# Patient Record
Sex: MALE | Race: WHITE | NOT HISPANIC OR LATINO | Employment: OTHER | URBAN - METROPOLITAN AREA
[De-identification: names, ages, dates, MRNs, and addresses within clinical notes are randomized per-mention and may not be internally consistent; named-entity substitution may affect disease eponyms.]

---

## 2023-05-16 ENCOUNTER — APPOINTMENT (OUTPATIENT)
Dept: CT IMAGING | Facility: CLINIC | Age: 48
End: 2023-05-16
Attending: EMERGENCY MEDICINE

## 2023-05-16 ENCOUNTER — HOSPITAL ENCOUNTER (EMERGENCY)
Facility: CLINIC | Age: 48
Discharge: HOME | End: 2023-05-16
Attending: EMERGENCY MEDICINE | Admitting: EMERGENCY MEDICINE

## 2023-05-16 VITALS
SYSTOLIC BLOOD PRESSURE: 110 MMHG | OXYGEN SATURATION: 99 % | HEART RATE: 74 BPM | DIASTOLIC BLOOD PRESSURE: 74 MMHG | TEMPERATURE: 98.2 F | RESPIRATION RATE: 16 BRPM

## 2023-05-16 DIAGNOSIS — K76.89 LIVER CYST: ICD-10-CM

## 2023-05-16 DIAGNOSIS — N20.0 KIDNEY STONE ON LEFT SIDE: ICD-10-CM

## 2023-05-16 LAB
ALBUMIN UR-MCNC: NEGATIVE MG/DL
ANION GAP SERPL CALCULATED.3IONS-SCNC: 10 MMOL/L (ref 7–15)
APPEARANCE UR: CLEAR
BASOPHILS # BLD AUTO: 0.1 10E3/UL (ref 0–0.2)
BASOPHILS NFR BLD AUTO: 2 %
BILIRUB UR QL STRIP: NEGATIVE
BUN SERPL-MCNC: 9.1 MG/DL (ref 6–20)
CALCIUM SERPL-MCNC: 9.2 MG/DL (ref 8.6–10)
CHLORIDE SERPL-SCNC: 102 MMOL/L (ref 98–107)
COLOR UR AUTO: ABNORMAL
CREAT SERPL-MCNC: 1.02 MG/DL (ref 0.67–1.17)
DEPRECATED HCO3 PLAS-SCNC: 27 MMOL/L (ref 22–29)
EOSINOPHIL # BLD AUTO: 0.4 10E3/UL (ref 0–0.7)
EOSINOPHIL NFR BLD AUTO: 5 %
ERYTHROCYTE [DISTWIDTH] IN BLOOD BY AUTOMATED COUNT: 12.5 % (ref 10–15)
GFR SERPL CREATININE-BSD FRML MDRD: >90 ML/MIN/1.73M2
GLUCOSE SERPL-MCNC: 134 MG/DL (ref 70–99)
GLUCOSE UR STRIP-MCNC: NEGATIVE MG/DL
HCT VFR BLD AUTO: 46.1 % (ref 40–53)
HGB BLD-MCNC: 15.8 G/DL (ref 13.3–17.7)
HGB UR QL STRIP: ABNORMAL
HOLD SPECIMEN: NORMAL
HOLD SPECIMEN: NORMAL
IMM GRANULOCYTES # BLD: 0 10E3/UL
IMM GRANULOCYTES NFR BLD: 0 %
KETONES UR STRIP-MCNC: ABNORMAL MG/DL
LEUKOCYTE ESTERASE UR QL STRIP: NEGATIVE
LYMPHOCYTES # BLD AUTO: 3.5 10E3/UL (ref 0.8–5.3)
LYMPHOCYTES NFR BLD AUTO: 46 %
MCH RBC QN AUTO: 32.1 PG (ref 26.5–33)
MCHC RBC AUTO-ENTMCNC: 34.3 G/DL (ref 31.5–36.5)
MCV RBC AUTO: 94 FL (ref 78–100)
MONOCYTES # BLD AUTO: 0.8 10E3/UL (ref 0–1.3)
MONOCYTES NFR BLD AUTO: 11 %
MUCOUS THREADS #/AREA URNS LPF: PRESENT /LPF
NEUTROPHILS # BLD AUTO: 2.7 10E3/UL (ref 1.6–8.3)
NEUTROPHILS NFR BLD AUTO: 36 %
NITRATE UR QL: NEGATIVE
NRBC # BLD AUTO: 0 10E3/UL
NRBC BLD AUTO-RTO: 0 /100
PH UR STRIP: 6.5 [PH] (ref 5–7)
PLATELET # BLD AUTO: 245 10E3/UL (ref 150–450)
POTASSIUM SERPL-SCNC: 3.8 MMOL/L (ref 3.4–5.3)
RBC # BLD AUTO: 4.92 10E6/UL (ref 4.4–5.9)
RBC URINE: 30 /HPF
SODIUM SERPL-SCNC: 139 MMOL/L (ref 136–145)
SP GR UR STRIP: 1.02 (ref 1–1.03)
UROBILINOGEN UR STRIP-MCNC: NORMAL MG/DL
WBC # BLD AUTO: 7.5 10E3/UL (ref 4–11)
WBC URINE: 2 /HPF

## 2023-05-16 PROCEDURE — 250N000011 HC RX IP 250 OP 636

## 2023-05-16 PROCEDURE — 250N000011 HC RX IP 250 OP 636: Performed by: EMERGENCY MEDICINE

## 2023-05-16 PROCEDURE — 81003 URINALYSIS AUTO W/O SCOPE: CPT | Performed by: EMERGENCY MEDICINE

## 2023-05-16 PROCEDURE — 85025 COMPLETE CBC W/AUTO DIFF WBC: CPT | Performed by: EMERGENCY MEDICINE

## 2023-05-16 PROCEDURE — 99285 EMERGENCY DEPT VISIT HI MDM: CPT | Mod: 25

## 2023-05-16 PROCEDURE — 82310 ASSAY OF CALCIUM: CPT | Performed by: EMERGENCY MEDICINE

## 2023-05-16 PROCEDURE — 96374 THER/PROPH/DIAG INJ IV PUSH: CPT

## 2023-05-16 PROCEDURE — 250N000013 HC RX MED GY IP 250 OP 250 PS 637: Performed by: EMERGENCY MEDICINE

## 2023-05-16 PROCEDURE — 96361 HYDRATE IV INFUSION ADD-ON: CPT

## 2023-05-16 PROCEDURE — 36415 COLL VENOUS BLD VENIPUNCTURE: CPT | Performed by: EMERGENCY MEDICINE

## 2023-05-16 PROCEDURE — 96376 TX/PRO/DX INJ SAME DRUG ADON: CPT

## 2023-05-16 PROCEDURE — 74176 CT ABD & PELVIS W/O CONTRAST: CPT

## 2023-05-16 PROCEDURE — 258N000003 HC RX IP 258 OP 636: Performed by: EMERGENCY MEDICINE

## 2023-05-16 PROCEDURE — 96375 TX/PRO/DX INJ NEW DRUG ADDON: CPT

## 2023-05-16 RX ORDER — TAMSULOSIN HYDROCHLORIDE 0.4 MG/1
0.4 CAPSULE ORAL ONCE
Status: COMPLETED | OUTPATIENT
Start: 2023-05-16 | End: 2023-05-16

## 2023-05-16 RX ORDER — KETOROLAC TROMETHAMINE 15 MG/ML
15 INJECTION, SOLUTION INTRAMUSCULAR; INTRAVENOUS ONCE
Status: COMPLETED | OUTPATIENT
Start: 2023-05-16 | End: 2023-05-16

## 2023-05-16 RX ORDER — ONDANSETRON 2 MG/ML
4 INJECTION INTRAMUSCULAR; INTRAVENOUS ONCE
Status: COMPLETED | OUTPATIENT
Start: 2023-05-16 | End: 2023-05-16

## 2023-05-16 RX ORDER — TAMSULOSIN HYDROCHLORIDE 0.4 MG/1
0.4 CAPSULE ORAL DAILY
Qty: 10 CAPSULE | Refills: 0 | Status: SHIPPED | OUTPATIENT
Start: 2023-05-16 | End: 2023-05-26

## 2023-05-16 RX ORDER — HYDROMORPHONE HYDROCHLORIDE 1 MG/ML
0.5 INJECTION, SOLUTION INTRAMUSCULAR; INTRAVENOUS; SUBCUTANEOUS
Status: COMPLETED | OUTPATIENT
Start: 2023-05-16 | End: 2023-05-16

## 2023-05-16 RX ORDER — HYDROMORPHONE HYDROCHLORIDE 1 MG/ML
0.5 INJECTION, SOLUTION INTRAMUSCULAR; INTRAVENOUS; SUBCUTANEOUS
Status: DISCONTINUED | OUTPATIENT
Start: 2023-05-16 | End: 2023-05-16 | Stop reason: HOSPADM

## 2023-05-16 RX ORDER — ONDANSETRON 4 MG/1
4 TABLET, ORALLY DISINTEGRATING ORAL EVERY 8 HOURS PRN
Qty: 10 TABLET | Refills: 0 | Status: SHIPPED | OUTPATIENT
Start: 2023-05-16 | End: 2023-05-19

## 2023-05-16 RX ORDER — HYDROCODONE BITARTRATE AND ACETAMINOPHEN 5; 325 MG/1; MG/1
1 TABLET ORAL EVERY 6 HOURS PRN
Qty: 7 TABLET | Refills: 0 | Status: SHIPPED | OUTPATIENT
Start: 2023-05-16 | End: 2023-05-19

## 2023-05-16 RX ORDER — HYDROMORPHONE HYDROCHLORIDE 1 MG/ML
INJECTION, SOLUTION INTRAMUSCULAR; INTRAVENOUS; SUBCUTANEOUS
Status: COMPLETED
Start: 2023-05-16 | End: 2023-05-16

## 2023-05-16 RX ADMIN — HYDROMORPHONE HYDROCHLORIDE 0.5 MG: 1 INJECTION, SOLUTION INTRAMUSCULAR; INTRAVENOUS; SUBCUTANEOUS at 05:45

## 2023-05-16 RX ADMIN — ONDANSETRON 4 MG: 2 INJECTION INTRAMUSCULAR; INTRAVENOUS at 07:00

## 2023-05-16 RX ADMIN — HYDROMORPHONE HYDROCHLORIDE 0.5 MG: 1 INJECTION, SOLUTION INTRAMUSCULAR; INTRAVENOUS; SUBCUTANEOUS at 07:01

## 2023-05-16 RX ADMIN — SODIUM CHLORIDE 1000 ML: 9 INJECTION, SOLUTION INTRAVENOUS at 05:31

## 2023-05-16 RX ADMIN — KETOROLAC TROMETHAMINE 15 MG: 15 INJECTION, SOLUTION INTRAMUSCULAR; INTRAVENOUS at 05:30

## 2023-05-16 RX ADMIN — TAMSULOSIN HYDROCHLORIDE 0.4 MG: 0.4 CAPSULE ORAL at 07:01

## 2023-05-16 ASSESSMENT — ENCOUNTER SYMPTOMS
FEVER: 0
BACK PAIN: 0
DYSURIA: 0
FREQUENCY: 1
SHORTNESS OF BREATH: 0
ABDOMINAL PAIN: 1
ANAL BLEEDING: 0
HEMATURIA: 0
VOMITING: 0
CHILLS: 0

## 2023-05-16 ASSESSMENT — ACTIVITIES OF DAILY LIVING (ADL): ADLS_ACUITY_SCORE: 35

## 2023-05-16 NOTE — ED TRIAGE NOTES
Lower left abd pain starting 1 hour ago, started suddenly. Feels like he needs to urinate but has been unable to. hx of kidney stones, state this is worse

## 2023-05-16 NOTE — ED PROVIDER NOTES
This patient was taken in signout pending urinalysis.  He has a ureteral stone with high likelihood of spontaneous passage.  He felt that he was not able to produce a urine sample.  Nursing staff did a straight cath.  There does not appear to be any sign of infection.  I offered the patient to watch him here to ensure that he is able to fully evacuate his bladder.  He does not have any signs of renal failure however.  He feels that his urinary hesitancy is related to pain.  He would prefer to go home at this point but agrees to return immediately if he is not able to urinate by the early afternoon.  We discussed the potential for renal failure if he is not able to urinate.  He acknowledges this.     David Smith MD  05/16/23 [id number]

## 2023-05-16 NOTE — DISCHARGE INSTRUCTIONS
Discharge Instructions  Kidney Stones    Kidney stones are a common problem that can cause a lot of pain but fortunately are usually not dangerous. Kidney stones form in the kidney and then can cause a blockage (obstruction) of the flow of urine from the kidney which leads to pain. Most patients can manage kidney stones at home (without a hospital stay).  However, sometimes your condition may be worse than it seemed at first, or may get worse with time. Most kidney stones will pass on their own, but occasionally stones may need to be removed by an urologist.    Generally, every Emergency Department visit should have a follow-up clinic visit with either a primary or a specialty clinic/provider. Please follow-up as instructed by your emergency provider today.      Return to the Emergency Department if:  Your pain is not controlled despite the medications provided or recommended.  You are vomiting (throwing up) and cannot keep fluids or medications down.  You develop a fever (>100.4 F).  You feel much more ill or develop new symptoms.  What can I do to help myself?  Be sure to drink plenty of fluids.  If instructed to do so, strain your urine (pee) with the urine strainer you were provided with today. Your stone may look like a grain of sand or a small pebble. Collect any stones in the cup provided and bring to your follow-up appointment.  Staying active is good, and may help the stone to pass. You may do whatever you feel up to doing without restrictions.   Treatment:  Non-steroidal anti-inflammatory drugs (NSAIDs). This includes prescription medicines like Toradol  (ketorolac) and non-prescription medicines like Advil  (ibuprofen) and Nuprin  (ibuprofen) and Naproxen. These pain relievers are very effective for kidney stones.  Nausea (sick to your stomach) medication.  Nausea and vomiting are common with kidney stones, so your provider may send you home with medicine for this.   Flomax  (tamsulosin). This medicine is  sometimes used for men with prostate problems, but also can help kidney stones to pass. Its effectiveness is controversial or questionable so it is prescribed in certain situations. This medicine can lower blood pressure, and you may feel faint/lightheaded, especially when you first stand up. Be sure to get up gradually, sit down if you feel faint, and avoid activity where feeling faint would be dangerous, such as climbing ladders.  If you were given a prescription for medicine here today, be sure to read all of the information (including the package insert) that comes with your prescription.  This will include important information about the medicine, its side effects, and any warnings that you need to know about.  The pharmacist who fills the prescription can provide more information and answer questions you may have about the medicine.  If you have questions or concerns that the pharmacist cannot address, please call or return to the Emergency Department.   Remember that you can always come back to the Emergency Department if you are not able to see your regular provider in the amount of time listed above, if you get any new symptoms, or if there is anything that worries you.            Opioid Medication Information    You have been given a prescription for an opioid (narcotic) pain medicine and/or have received a pain medicine while here in the Emergency Department. These medicines can make you drowsy or impaired. You must not drive, operate dangerous equipment, or engage in any other dangerous activities while taking these medications. If you drive while taking these medications, you could be arrested for driving under the influence (DUI). Do not drink any alcohol while you are taking these medications.     Opioid pain medications can cause addiction. If you have a history of chemical dependency of any type, you are at a higher risk of becoming addicted to pain medications.  Only take these prescribed  medications to treat your pain when all other options have been tried. Take it for as short a time and as few doses as possible. Store your pain pills in a secure place, as they are frequently stolen and provide a dangerous opportunity for children or visitors in your house to start abusing these powerful medications. We will not replace any lost or stolen medicine.    If you do not finish your medication, it is a good idea to get rid of it but please do not flush it down the toilet. Please dispose of the remaining medication at a local pharmacy or law enforcement facility. The [place] [employer] has additional information on medication disposal: https://www.pca.[facility].[place]/living-green/managing-unwanted-medications.      Many prescription pain medications contain Tylenol  (acetaminophen), including Vicodin , Tylenol #3 , Norco , Lortab , and Percocet .  You should not take any extra pills of Tylenol  if you are using these prescription medications or you can get very sick.  Do not ever take more than 3000 mg of acetaminophen in any 24 hour period.    All opioids tend to cause constipation. Drink plenty of water and eat foods that have a lot of fiber, such as fruits, vegetables, prune juice, apple juice and high fiber cereal.  Take a laxative if you don t move your bowels at least every other day. Miralax , Milk of Magnesia, Colace , or Senna  can be used to keep you regular.

## 2023-05-16 NOTE — ED PROVIDER NOTES
History     Chief Complaint:  Abdominal Pain       The history is provided by the patient.      Mingo Melgoza is a 48 year old male with a history of kidney stones who presents with lower left quadrant pain and urinary urgency and frequency.  Patient states that he has intermittently had the urge to urinate and pass stool but was unable to for the past few days.  He specifically reports having increased urgency and frequency with urination.  This morning, he woke up approximately 3 hours ago with urge to urinate and have a bowel movement but was unable to.  He then suddenly developed severe sharp lower left quadrant pain.  The pain does not worsen or improve with anything.  He states his pain and symptoms are different and worse than his symptoms with previous kidney stones.  He denies history of surgical intervention for kidney stones as he has been able to pass them on his own.  He denies dysuria and hematuria but notes he had light rectal bleeding when he wiped after a bowel movement.  He denies dysuria, back pain, fever, chills, vomiting, chest pain, and shortness of breath.        Independent Historian:   None - Patient Only    Review of External Notes: None     ROS:  Review of Systems   Constitutional: Negative for chills and fever.   Respiratory: Negative for shortness of breath.    Cardiovascular: Negative for chest pain.   Gastrointestinal: Positive for abdominal pain. Negative for anal bleeding and vomiting.   Genitourinary: Positive for frequency and urgency. Negative for dysuria and hematuria.   Musculoskeletal: Negative for back pain.   All other systems reviewed and are negative.      Allergies:  No Known Allergies     Medications:    No current outpatient medications on file.    Past Medical History:    Kidney stones    Social History:  The patient presents alone     Physical Exam     Patient Vitals for the past 24 hrs:   BP Temp Pulse Resp SpO2   05/16/23 0630 114/69 -- 69 -- 94 %   05/16/23 0600  115/70 -- 78 -- 94 %   05/16/23 0545 (!) 131/123 -- 82 -- 95 %   05/16/23 0540 (!) 131/123 -- 89 -- --   05/16/23 0519 (!) 156/109 98.2  F (36.8  C) 92 18 98 %        Physical Exam  General: Alert, no acute distress; appears uncomfortable 2/2 left flank pain  HEENT:  Moist mucous membranes. Conjunctiva normal.   CV:  RRR, no m/r/g, skin warm and well perfused  Pulm:  CTAB, no wheezes/ronchi/rales.  No acute distress, breathing comfortably  GI:  Soft, mild LLQ tenderness, nondistended.  No rebound or guarding.   MSK:  Moving all extremities.  No focal areas of edema, erythema; mild left CVA tendernes  Skin:  WWP, no rashes, skin color normal, no diaphoresis    Emergency Department Course     Imaging:  Abd/pelvis CT no contrast - Stone Protocol   Final Result   IMPRESSION:    1.  Mild left hydronephrosis and hydroureter. 3.3 mm stone in the distal left ureter near the left ureterovesicular junction. No other stones seen along the courses of the ureters. No other renal calculi noted.      2.  Low 7 x 11 mm attenuation focus in the right anterior lateral liver is not seen on the prior examination likely represent cyst. No follow-up necessary.            Report per radiology    Laboratory:  Labs Ordered and Resulted from Time of ED Arrival to Time of ED Departure   BASIC METABOLIC PANEL - Abnormal       Result Value    Sodium 139      Potassium 3.8      Chloride 102      Carbon Dioxide (CO2) 27      Anion Gap 10      Urea Nitrogen 9.1      Creatinine 1.02      Calcium 9.2      Glucose 134 (*)     GFR Estimate >90     CBC WITH PLATELETS AND DIFFERENTIAL    WBC Count 7.5      RBC Count 4.92      Hemoglobin 15.8      Hematocrit 46.1      MCV 94      MCH 32.1      MCHC 34.3      RDW 12.5      Platelet Count 245      % Neutrophils 36      % Lymphocytes 46      % Monocytes 11      % Eosinophils 5      % Basophils 2      % Immature Granulocytes 0      NRBCs per 100 WBC 0      Absolute Neutrophils 2.7      Absolute Lymphocytes  3.5      Absolute Monocytes 0.8      Absolute Eosinophils 0.4      Absolute Basophils 0.1      Absolute Immature Granulocytes 0.0      Absolute NRBCs 0.0     ROUTINE UA WITH MICROSCOPIC REFLEX TO CULTURE        Procedures   None    Emergency Department Course & Assessments:    Interventions:  Medications   HYDROmorphone (PF) (DILAUDID) injection 0.5 mg (has no administration in time range)   ketorolac (TORADOL) injection 15 mg (15 mg Intravenous $Given 23 0530)   0.9% sodium chloride BOLUS (0 mLs Intravenous Stopped 23 0613)   HYDROmorphone (PF) (DILAUDID) injection 0.5 mg (0.5 mg Intravenous $Given 23 0701)   tamsulosin (FLOMAX) capsule 0.4 mg (0.4 mg Oral Not Given 23 0637)   tamsulosin (FLOMAX) capsule 0.4 mg (0.4 mg Oral $Given 23 07)   ondansetron (ZOFRAN) injection 4 mg (4 mg Intravenous $Given 23 0700)        Assessments:  0542 Initial assessment. I gathered history and examined the patient as noted above.     Independent Interpretation (X-rays, CTs, rhythm strip):  None    Consultations/Discussion of Management or Tests:  None      Social Determinants of Health affecting care:   None    Disposition:  Patient endorsed to my partner Dr. Smith pending UA.  Anticipate discharge home if normal.    Impression & Plan      Medical Decision Makin-year-old male with history of kidney stones presenting to the ER for evaluation of sudden onset left flank pain.  Please above for details of HPI and exam.  Patient is afebrile vitally stable.  He is uncomfortable appearing on my examination.  Mild LLQ tenderness.  Differential considered including but is not limited to diverticulitis, kidney stone/ureterolithiasis, cystitis/UTI, colitis, musculoskeletal pain amongst other things.  CT shows 3 mm stone at the left UVJ causing mild left hydronephrosis.  Incidental finding of liver cyst seen and discussed with the patient, no follow-up necessary per radiology.  Patient's basic lab  studies are unremarkable.  Pain is controlled in the emergency department.      With reasonable certainty, patient is safe to discharge home.  Recommend follow-up with urology in 1 week if symptoms persist.  Recommend Tylenol/ibuprofen, Flomax, Zofran as needed for nausea/vomiting and we will provide short course of pain medication for breakthrough/severe pain.  Opioid discussion had at bedside.      Patient unable to provide urine sample at the end of my shift.  Patient endorsed to my partner Dr. Smith to follow-up on UA.  If no concerning findings for UTI and patient remains comfortable appearing, anticipate discharge home.            Diagnosis:    ICD-10-CM    1. Kidney stone on left side  N20.0       2. Liver cyst  K76.89            Discharge Medications:  New Prescriptions    HYDROCODONE-ACETAMINOPHEN (NORCO) 5-325 MG TABLET    Take 1 tablet by mouth every 6 hours as needed for severe pain    ONDANSETRON (ZOFRAN ODT) 4 MG ODT TAB    Take 1 tablet (4 mg) by mouth every 8 hours as needed for nausea or vomiting    TAMSULOSIN (FLOMAX) 0.4 MG CAPSULE    Take 1 capsule (0.4 mg) by mouth daily for 10 doses          Scribe Disclosure:  Leanna HAQUE, am serving as a scribe at 5:37 AM on 5/16/2023 to document services personally performed by Marco English MD based on my observations and the provider's statements to me.     5/16/2023   Marco English MD [place], Edgar Ronald, MD  05/16/23 [id number]

## 2023-05-16 NOTE — ED NOTES
Woke up to go to the bathroom unable  severe pain on left flank . Hx of kidney stones but not lately tried several times to urinate but unable .

## 2024-01-23 ENCOUNTER — HOSPITAL ENCOUNTER (INPATIENT)
Facility: CLINIC | Age: 49
LOS: 2 days | Discharge: HOME | DRG: 322 | End: 2024-01-25
Attending: SOCIAL WORKER | Admitting: INTERNAL MEDICINE
Payer: COMMERCIAL

## 2024-01-23 ENCOUNTER — APPOINTMENT (OUTPATIENT)
Dept: GENERAL RADIOLOGY | Facility: CLINIC | Age: 49
DRG: 322 | End: 2024-01-23
Attending: SOCIAL WORKER

## 2024-01-23 DIAGNOSIS — I25.10 CORONARY ARTERY DISEASE INVOLVING NATIVE CORONARY ARTERY OF NATIVE HEART WITHOUT ANGINA PECTORIS: ICD-10-CM

## 2024-01-23 DIAGNOSIS — I21.3 ST ELEVATION MI (STEMI) (H): ICD-10-CM

## 2024-01-23 DIAGNOSIS — F32.A DEPRESSION, UNSPECIFIED DEPRESSION TYPE: ICD-10-CM

## 2024-01-23 DIAGNOSIS — I21.3 ST ELEVATION MYOCARDIAL INFARCTION (STEMI), UNSPECIFIED ARTERY (H): Primary | ICD-10-CM

## 2024-01-23 PROBLEM — Z98.61 PERCUTANEOUS TRANSLUMINAL CORONARY ANGIOPLASTY STATUS: Status: ACTIVE | Noted: 2024-01-23

## 2024-01-23 LAB
ACT BLD: 178 SECONDS (ref 74–150)
ACT BLD: 189 SECONDS (ref 74–150)
ACT BLD: 213 SECONDS (ref 74–150)
ACT BLD: 255 SECONDS (ref 74–150)
ANION GAP SERPL CALCULATED.3IONS-SCNC: 11 MMOL/L (ref 7–15)
APAP SERPL-MCNC: 8 UG/ML (ref 10–30)
BUN SERPL-MCNC: 10.5 MG/DL (ref 6–20)
CALCIUM SERPL-MCNC: 9.2 MG/DL (ref 8.6–10)
CHLORIDE SERPL-SCNC: 100 MMOL/L (ref 98–107)
CREAT SERPL-MCNC: 0.87 MG/DL (ref 0.67–1.17)
DEPRECATED HCO3 PLAS-SCNC: 28 MMOL/L (ref 22–29)
EGFRCR SERPLBLD CKD-EPI 2021: >90 ML/MIN/1.73M2
ERYTHROCYTE [DISTWIDTH] IN BLOOD BY AUTOMATED COUNT: 12.5 % (ref 10–15)
GLUCOSE SERPL-MCNC: 174 MG/DL (ref 70–99)
HCT VFR BLD AUTO: 43.3 % (ref 40–53)
HGB BLD-MCNC: 14.8 G/DL (ref 13.3–17.7)
HOLD SPECIMEN: NORMAL
ISTAT ACT REQUEST ONLY: NORMAL
MCH RBC QN AUTO: 32 PG (ref 26.5–33)
MCHC RBC AUTO-ENTMCNC: 34.2 G/DL (ref 31.5–36.5)
MCV RBC AUTO: 94 FL (ref 78–100)
PLATELET # BLD AUTO: 232 10E3/UL (ref 150–450)
POTASSIUM SERPL-SCNC: 3.8 MMOL/L (ref 3.4–5.3)
RBC # BLD AUTO: 4.63 10E6/UL (ref 4.4–5.9)
SODIUM SERPL-SCNC: 139 MMOL/L (ref 135–145)
TROPONIN T SERPL HS-MCNC: 2522 NG/L
WBC # BLD AUTO: 11.4 10E3/UL (ref 4–11)

## 2024-01-23 PROCEDURE — 272N000001 HC OR GENERAL SUPPLY STERILE: Performed by: INTERNAL MEDICINE

## 2024-01-23 PROCEDURE — 99284 EMERGENCY DEPT VISIT MOD MDM: CPT | Mod: 25

## 2024-01-23 PROCEDURE — 99222 1ST HOSP IP/OBS MODERATE 55: CPT | Mod: AI | Performed by: HOSPITALIST

## 2024-01-23 PROCEDURE — 93005 ELECTROCARDIOGRAM TRACING: CPT

## 2024-01-23 PROCEDURE — 85027 COMPLETE CBC AUTOMATED: CPT | Performed by: HOSPITALIST

## 2024-01-23 PROCEDURE — 36415 COLL VENOUS BLD VENIPUNCTURE: CPT | Performed by: SOCIAL WORKER

## 2024-01-23 PROCEDURE — 85347 COAGULATION TIME ACTIVATED: CPT

## 2024-01-23 PROCEDURE — 80061 LIPID PANEL: CPT | Performed by: INTERNAL MEDICINE

## 2024-01-23 PROCEDURE — B2111ZZ FLUOROSCOPY OF MULTIPLE CORONARY ARTERIES USING LOW OSMOLAR CONTRAST: ICD-10-PCS | Performed by: INTERNAL MEDICINE

## 2024-01-23 PROCEDURE — 92920 PRQ TRLUML C ANGIOP 1ART&/BR: CPT | Performed by: INTERNAL MEDICINE

## 2024-01-23 PROCEDURE — C1725 CATH, TRANSLUMIN NON-LASER: HCPCS | Performed by: INTERNAL MEDICINE

## 2024-01-23 PROCEDURE — 250N000013 HC RX MED GY IP 250 OP 250 PS 637: Performed by: INTERNAL MEDICINE

## 2024-01-23 PROCEDURE — 99222 1ST HOSP IP/OBS MODERATE 55: CPT | Mod: 25 | Performed by: INTERNAL MEDICINE

## 2024-01-23 PROCEDURE — 027034Z DILATION OF CORONARY ARTERY, ONE ARTERY WITH DRUG-ELUTING INTRALUMINAL DEVICE, PERCUTANEOUS APPROACH: ICD-10-PCS | Performed by: INTERNAL MEDICINE

## 2024-01-23 PROCEDURE — C1894 INTRO/SHEATH, NON-LASER: HCPCS | Performed by: INTERNAL MEDICINE

## 2024-01-23 PROCEDURE — 250N000011 HC RX IP 250 OP 636: Performed by: INTERNAL MEDICINE

## 2024-01-23 PROCEDURE — C1874 STENT, COATED/COV W/DEL SYS: HCPCS | Performed by: INTERNAL MEDICINE

## 2024-01-23 PROCEDURE — 93010 ELECTROCARDIOGRAM REPORT: CPT | Mod: XE | Performed by: INTERNAL MEDICINE

## 2024-01-23 PROCEDURE — 250N000011 HC RX IP 250 OP 636: Performed by: SOCIAL WORKER

## 2024-01-23 PROCEDURE — 258N000003 HC RX IP 258 OP 636: Performed by: INTERNAL MEDICINE

## 2024-01-23 PROCEDURE — 36415 COLL VENOUS BLD VENIPUNCTURE: CPT | Performed by: INTERNAL MEDICINE

## 2024-01-23 PROCEDURE — C1769 GUIDE WIRE: HCPCS | Performed by: INTERNAL MEDICINE

## 2024-01-23 PROCEDURE — 250N000009 HC RX 250: Performed by: INTERNAL MEDICINE

## 2024-01-23 PROCEDURE — 250N000013 HC RX MED GY IP 250 OP 250 PS 637: Performed by: SOCIAL WORKER

## 2024-01-23 PROCEDURE — 210N000001 HC R&B IMCU HEART CARE

## 2024-01-23 PROCEDURE — C9600 PERC DRUG-EL COR STENT SING: HCPCS | Mod: LD | Performed by: INTERNAL MEDICINE

## 2024-01-23 PROCEDURE — 96374 THER/PROPH/DIAG INJ IV PUSH: CPT | Mod: 59

## 2024-01-23 PROCEDURE — 80143 DRUG ASSAY ACETAMINOPHEN: CPT | Performed by: HOSPITALIST

## 2024-01-23 PROCEDURE — 71045 X-RAY EXAM CHEST 1 VIEW: CPT

## 2024-01-23 PROCEDURE — 84484 ASSAY OF TROPONIN QUANT: CPT | Performed by: INTERNAL MEDICINE

## 2024-01-23 PROCEDURE — C1887 CATHETER, GUIDING: HCPCS | Performed by: INTERNAL MEDICINE

## 2024-01-23 PROCEDURE — 93454 CORONARY ARTERY ANGIO S&I: CPT | Performed by: INTERNAL MEDICINE

## 2024-01-23 PROCEDURE — 80048 BASIC METABOLIC PNL TOTAL CA: CPT | Performed by: HOSPITALIST

## 2024-01-23 DEVICE — STENT COR ONYX FRONTIER 22X3MM ONYXNG30022UX: Type: IMPLANTABLE DEVICE | Status: FUNCTIONAL

## 2024-01-23 RX ORDER — HEPARIN SODIUM 1000 [USP'U]/ML
INJECTION, SOLUTION INTRAVENOUS; SUBCUTANEOUS
Status: DISCONTINUED | OUTPATIENT
Start: 2024-01-23 | End: 2024-01-23

## 2024-01-23 RX ORDER — NITROGLYCERIN 0.4 MG/1
0.4 TABLET SUBLINGUAL EVERY 5 MIN PRN
Status: DISCONTINUED | OUTPATIENT
Start: 2024-01-23 | End: 2024-01-25 | Stop reason: HOSPADM

## 2024-01-23 RX ORDER — ASPIRIN 81 MG/1
81 TABLET ORAL DAILY
Qty: 30 TABLET | Refills: 3 | Status: SHIPPED | OUTPATIENT
Start: 2024-01-24 | End: 2024-01-25

## 2024-01-23 RX ORDER — ONDANSETRON 4 MG/1
4 TABLET, ORALLY DISINTEGRATING ORAL EVERY 6 HOURS PRN
Status: DISCONTINUED | OUTPATIENT
Start: 2024-01-23 | End: 2024-01-25 | Stop reason: HOSPADM

## 2024-01-23 RX ORDER — NITROGLYCERIN 0.4 MG/1
0.4 TABLET SUBLINGUAL EVERY 5 MIN PRN
Status: DISCONTINUED | OUTPATIENT
Start: 2024-01-23 | End: 2024-01-23

## 2024-01-23 RX ORDER — FLUMAZENIL 0.1 MG/ML
0.2 INJECTION, SOLUTION INTRAVENOUS
Status: ACTIVE | OUTPATIENT
Start: 2024-01-23 | End: 2024-01-24

## 2024-01-23 RX ORDER — SENNOSIDES 8.6 MG
1 TABLET ORAL 2 TIMES DAILY PRN
Status: DISCONTINUED | OUTPATIENT
Start: 2024-01-23 | End: 2024-01-25 | Stop reason: HOSPADM

## 2024-01-23 RX ORDER — ATROPINE SULFATE 0.1 MG/ML
0.5 INJECTION INTRAVENOUS
Status: DISPENSED | OUTPATIENT
Start: 2024-01-23 | End: 2024-01-24

## 2024-01-23 RX ORDER — METOPROLOL TARTRATE 25 MG/1
25 TABLET, FILM COATED ORAL 2 TIMES DAILY
Status: DISCONTINUED | OUTPATIENT
Start: 2024-01-23 | End: 2024-01-25

## 2024-01-23 RX ORDER — ROSUVASTATIN CALCIUM 20 MG/1
20 TABLET, COATED ORAL DAILY
Qty: 90 TABLET | Refills: 3 | Status: SHIPPED | OUTPATIENT
Start: 2024-01-23 | End: 2024-01-25

## 2024-01-23 RX ORDER — ONDANSETRON 2 MG/ML
4 INJECTION INTRAMUSCULAR; INTRAVENOUS EVERY 6 HOURS PRN
Status: DISCONTINUED | OUTPATIENT
Start: 2024-01-23 | End: 2024-01-25 | Stop reason: HOSPADM

## 2024-01-23 RX ORDER — NITROGLYCERIN 5 MG/ML
VIAL (ML) INTRAVENOUS
Status: DISCONTINUED | OUTPATIENT
Start: 2024-01-23 | End: 2024-01-23

## 2024-01-23 RX ORDER — HYDRALAZINE HYDROCHLORIDE 20 MG/ML
10 INJECTION INTRAMUSCULAR; INTRAVENOUS EVERY 4 HOURS PRN
Status: DISCONTINUED | OUTPATIENT
Start: 2024-01-23 | End: 2024-01-25 | Stop reason: HOSPADM

## 2024-01-23 RX ORDER — NALOXONE HYDROCHLORIDE 0.4 MG/ML
0.2 INJECTION, SOLUTION INTRAMUSCULAR; INTRAVENOUS; SUBCUTANEOUS
Status: ACTIVE | OUTPATIENT
Start: 2024-01-23 | End: 2024-01-24

## 2024-01-23 RX ORDER — LIDOCAINE 40 MG/G
CREAM TOPICAL
Status: DISCONTINUED | OUTPATIENT
Start: 2024-01-23 | End: 2024-01-25 | Stop reason: HOSPADM

## 2024-01-23 RX ORDER — METOPROLOL TARTRATE 1 MG/ML
5 INJECTION, SOLUTION INTRAVENOUS
Status: DISCONTINUED | OUTPATIENT
Start: 2024-01-23 | End: 2024-01-25 | Stop reason: HOSPADM

## 2024-01-23 RX ORDER — HEPARIN SODIUM 10000 [USP'U]/100ML
100-1000 INJECTION, SOLUTION INTRAVENOUS CONTINUOUS PRN
Status: DISCONTINUED | OUTPATIENT
Start: 2024-01-23 | End: 2024-01-23

## 2024-01-23 RX ORDER — LISINOPRIL 2.5 MG/1
2.5 TABLET ORAL DAILY
Status: DISCONTINUED | OUTPATIENT
Start: 2024-01-23 | End: 2024-01-24

## 2024-01-23 RX ORDER — OXYCODONE HYDROCHLORIDE 5 MG/1
10 TABLET ORAL EVERY 4 HOURS PRN
Status: DISCONTINUED | OUTPATIENT
Start: 2024-01-23 | End: 2024-01-25 | Stop reason: HOSPADM

## 2024-01-23 RX ORDER — SODIUM CHLORIDE 9 MG/ML
INJECTION, SOLUTION INTRAVENOUS CONTINUOUS
Status: ACTIVE | OUTPATIENT
Start: 2024-01-23 | End: 2024-01-23

## 2024-01-23 RX ORDER — NALOXONE HYDROCHLORIDE 0.4 MG/ML
0.4 INJECTION, SOLUTION INTRAMUSCULAR; INTRAVENOUS; SUBCUTANEOUS
Status: ACTIVE | OUTPATIENT
Start: 2024-01-23 | End: 2024-01-24

## 2024-01-23 RX ORDER — ROSUVASTATIN CALCIUM 20 MG/1
20 TABLET, COATED ORAL DAILY
Status: DISCONTINUED | OUTPATIENT
Start: 2024-01-23 | End: 2024-01-25 | Stop reason: HOSPADM

## 2024-01-23 RX ORDER — OXYCODONE HYDROCHLORIDE 5 MG/1
5 TABLET ORAL EVERY 4 HOURS PRN
Status: DISCONTINUED | OUTPATIENT
Start: 2024-01-23 | End: 2024-01-25 | Stop reason: HOSPADM

## 2024-01-23 RX ORDER — NICOTINE 21 MG/24HR
1 PATCH, TRANSDERMAL 24 HOURS TRANSDERMAL DAILY
Status: DISCONTINUED | OUTPATIENT
Start: 2024-01-23 | End: 2024-01-25 | Stop reason: HOSPADM

## 2024-01-23 RX ORDER — ASPIRIN 81 MG/1
81 TABLET ORAL DAILY
Status: DISCONTINUED | OUTPATIENT
Start: 2024-01-24 | End: 2024-01-25 | Stop reason: HOSPADM

## 2024-01-23 RX ORDER — ASPIRIN 81 MG/1
81 TABLET, CHEWABLE ORAL ONCE
Status: COMPLETED | OUTPATIENT
Start: 2024-01-23 | End: 2024-01-23

## 2024-01-23 RX ORDER — FENTANYL CITRATE 50 UG/ML
25 INJECTION, SOLUTION INTRAMUSCULAR; INTRAVENOUS
Status: DISCONTINUED | OUTPATIENT
Start: 2024-01-23 | End: 2024-01-25 | Stop reason: HOSPADM

## 2024-01-23 RX ORDER — ACETAMINOPHEN 325 MG/1
650 TABLET ORAL EVERY 4 HOURS PRN
Status: DISCONTINUED | OUTPATIENT
Start: 2024-01-23 | End: 2024-01-25 | Stop reason: HOSPADM

## 2024-01-23 RX ORDER — POLYETHYLENE GLYCOL 3350 17 G/17G
17 POWDER, FOR SOLUTION ORAL 2 TIMES DAILY PRN
Status: DISCONTINUED | OUTPATIENT
Start: 2024-01-23 | End: 2024-01-25 | Stop reason: HOSPADM

## 2024-01-23 RX ADMIN — NITROGLYCERIN 0.4 MG: 0.4 TABLET SUBLINGUAL at 16:49

## 2024-01-23 RX ADMIN — ASPIRIN 81 MG CHEWABLE TABLET 81 MG: 81 TABLET CHEWABLE at 20:55

## 2024-01-23 RX ADMIN — LISINOPRIL 2.5 MG: 2.5 TABLET ORAL at 20:55

## 2024-01-23 RX ADMIN — METOPROLOL TARTRATE 25 MG: 25 TABLET, FILM COATED ORAL at 20:55

## 2024-01-23 RX ADMIN — ONDANSETRON 4 MG: 2 INJECTION INTRAMUSCULAR; INTRAVENOUS at 19:48

## 2024-01-23 RX ADMIN — ROSUVASTATIN CALCIUM 20 MG: 20 TABLET, FILM COATED ORAL at 20:55

## 2024-01-23 RX ADMIN — TICAGRELOR 180 MG: 90 TABLET ORAL at 16:56

## 2024-01-23 RX ADMIN — HEPARIN SODIUM 4000 UNITS: 1000 INJECTION, SOLUTION INTRAVENOUS; SUBCUTANEOUS at 16:58

## 2024-01-23 ASSESSMENT — ACTIVITIES OF DAILY LIVING (ADL)
ADLS_ACUITY_SCORE: 35

## 2024-01-23 NOTE — PRE-PROCEDURE
GENERAL PRE-PROCEDURE:   Procedure:  Coronary angiogram  Date/Time:  1/23/2024 4:57 PM    Verbal consent obtained?: Yes    Written consent obtained?: Yes    Risks and benefits: Risks, benefits and alternatives were discussed    Consent given by:  Patient  Patient states understanding of procedure being performed: Yes    Patient's understanding of procedure matches consent: Yes    Procedure consent matches procedure scheduled: Yes    Expected level of sedation:  Moderate  Appropriately NPO:  Yes  ASA Class:  3  Mallampati  :  Grade 3- soft palate visible, posterior pharyngeal wall not visible  Lungs:  Lungs clear with good breath sounds bilaterally  Heart:  Normal heart sounds and rate  History & Physical reviewed:  History and physical reviewed and no updates needed  Statement of review:  I have reviewed the lab findings, diagnostic data, medications, and the plan for sedation

## 2024-01-23 NOTE — CONSULTS
Cardiology Consultation     Date of Admission:  1/23/2024    Assessment & Plan     1.  Anterior MI current presentation  2.  One-vessel coronary disease by angiogram with successful PCI to culprit mid LAD with placement of a 3.0 x 22 mm resolute [place] stent  3.  Mild disease noted elsewhere  4.  Suicidal ideation    Recommendations    1.  ACS with anterior MI: Patient will be admitted to floor for continued recovery.  Troponins will be trended.  He will be initiated on dual antiplatelet therapy consisting of aspirin and Brilinta for the time being.  Social work and work with him regarding affordability.  Low-dose beta-blocker and ACE inhibitor have been initiated and high-dose statin has been started.    2.  Bedrest per protocol    3.  Echocardiogram will be ordered    4.  Patient has suicidal ideation, defer to hospitalist colleagues regarding further management.    5.  Inpatient cardiology team will follow along, appreciate hospitalist assistance.      Scott Harrison MD, MD          HPI:    Patient is a 48-year-old gentleman with no prior cardiac history who presents for acute onset of chest pain.  Per chart review patient was seen early morning by police because he was suicidal and took hydrocodone.  No hold was placed.  The afternoon of his current presentation patient developed acute substernal chest pain which she described as a pressure sensation which radiated to his left arm.  EMS was activated and called.  Anterior STEMI pattern with hyperacute T waves were noted and Cath Lab team was activated.  Single-vessel occlusive disease was noted and emergent PCI was performed.  A 3.0 x 22 mm resolute [place] stent was deployed in the mid LAD with good result.  Mild disease was noted elsewhere.  Patient is taken to floor for continued recovery.          Primary Care Physician   Physician No Ref-Primary      Past Medical History   I have reviewed this patient's medical  history and updated it with pertinent information if needed.   Past Medical History:   Diagnosis Date    Nephrolithiasis        Past Surgical History   I have reviewed this patient's surgical history and updated it with pertinent information if needed.  No past surgical history on file.    Prior to Admission Medications   None     Current Facility-Administered Medications   Medication Dose Route Frequency     Current Facility-Administered Medications   Medication Last Rate    HEParin       Allergies   No Known Allergies    Social History        Family History   No family history on file.    Review of Systems   The comprehensive 10 point Review of Systems is negative other than noted in the HPI or here.     Physical Exam   Vital Signs with Ranges  Temp:  [98.1  F (36.7  C)] 98.1  F (36.7  C)  Pulse:  [73-90] 73  Resp:  [12-22] 12  BP: (110-118)/(72-81) 118/72  SpO2:  [90 %-92 %] 90 %  Wt Readings from Last 4 Encounters:   01/23/24 59.8 kg (131 lb 13.4 oz)     No intake/output data recorded.      Vitals: /72   Pulse 73   Temp 98.1  F (36.7  C)   Resp 12   Wt 59.8 kg (131 lb 13.4 oz)   SpO2 90%     GENERAL: alert and no distress  EYES: Eyes grossly normal to inspection.  No discharge or erythema, or obvious scleral/conjunctival abnormalities.  RESP: No audible wheeze, cough, or visible cyanosis.    CV:  RRR  Lungs:  CTA B  Abdomen:  soft NT  SKIN: Visible skin clear. No significant rash, abnormal pigmentation or lesions.  NEURO: Cranial nerves grossly intact.  Mentation and speech appropriate for age.  PSYCH: Appropriate affect, tone, and pace of words

## 2024-01-23 NOTE — Clinical Note
The first balloon was inserted into the left anterior descending.Max pressure = 10 radha. Total duration = 24 seconds.

## 2024-01-23 NOTE — ED NOTES
Pt present via EMS, per EMS pt from home where he lives along this afternoon reported sudden onset of CP accompanied by n/v     Ems was called and found STEMI on EKG     Ems gave x3 nitroglycerin that initially helped and then pain came back   Ems also gave 4 zofran     Of note pt also called pd earlier today due to feeling SI statement and taking 5 pills of oxycodone

## 2024-01-23 NOTE — Clinical Note
The first balloon was inserted into the left anterior descending.Max pressure = 12 radha. Total duration = 15 seconds.     Max pressure = 12 radha. Total duration = 14 seconds.

## 2024-01-23 NOTE — ED NOTES
Bed: ST03  Expected date:   Expected time:   Means of arrival:   Comments:  [id number] Cath Lab Activation

## 2024-01-23 NOTE — Clinical Note
Stent deployed in the left anterior descending. Max pressure = 11 radha. Total duration = 20 seconds.

## 2024-01-23 NOTE — ED PROVIDER NOTES
History     Chief Complaint:  Chest Pain       HPI   Mingo Melgoza is a 48 year old male who presents with chest pain.  Per EMS, the patient was seen this morning by police because he was suicidal and took 5mg oxycodone with self-harm intent. The police did not place him on a hold. Then this afternoon he developed some left sided chest pain with numbness into his left arm. Patient is also having some nausea. Patient received aspirin and 3 doses of nitroglycerin which helped the p[ain for a little. The EMS EKG showed a STEMI. Patient denies any abdominal pain or back pain. Patient has no personal history of heart problems but has family history of heart attacks.  He also notes that over the past week he has had intermittent episodes of similar chest discomfort however this episode today did not resolve. He denies any drug use particularly any cocaine use. Reports tobacco use.    Independent Historian:   EMS report above    Review of External Notes:   Reviewed ED visit from 5/16/2023, seen at that time for flank pain found to have a kidney stone    Medications:    The patient is currently on no regular medications.    Past Medical History:    The patient has no pertinent medical history    Past Surgical History:    No patient has no pertinent surgical history     Physical Exam   No data found.     Physical Exam  General: Overall stable and nontoxic appearing  HEENT: Conjunctivae clear, no scleral icterus, mucous membranes moist  Neuro: Alert, moving all extremities equally with intention  CV: No murmur appreciated, pulses equal bilaterally in the upper and lower extremities   Respiratory: No signs of respiratory distress, lungs clear to auscultation bilaterally   Abdomen: Soft, without rigidity or rebound throughout  MSK: No lower extremity swelling or tenderness    Emergency Department Course   ECG  Electrocardiogram  ECG taken at 1642, ECG interpreted at 1643 by myself  Sinus rhythm with normal axis, ST  elevations in V2 V3 c/w STEMI  Rate 77 bpm. MD interval 142. QRS duration 72. QTc 407     Imaging:  Cardiac Catheterization   Final Result      XR Chest Port 1 View   Final Result   IMPRESSION: Prominent aspect of both lung bases is excluded from field-of-view.      Within this limitation, lungs are clear. No visualized pleural effusion or pneumothorax.      Cardiomediastinal silhouette is normal.      Consider repeat imaging of the chest to include the entirety of the lung bases.      Echocardiogram Complete    (Results Pending)      Laboratory:  Labs Ordered and Resulted from Time of ED Arrival to Time of ED Departure - No data to display     Procedures       Emergency Department Course & Assessments:    Interventions:  Medications   nitroGLYcerin (NITROSTAT) sublingual tablet 0.4 mg (has no administration in time range)      Assessments:  1643 Obtained the patients history and performed initial exam    Independent Interpretation (X-rays, CTs, rhythm strip):  My independent interpretation of patient's chest x-ray: No widened mediastinum, no pneumothorax, no consolidation to the limits of the chest x-ray that was taken    Consultations/Discussion of Management or Tests:       Social Determinants of Health affecting care:   None    Disposition:  The patient was transferred to cath lab.    Impression & Plan      Critical Care  Critical Care is defined as an illness or injury that acutely impairs one or more vital organ systems such that there is a high probability of imminent or life-threatening deterioration in the patient's condition and that the failure to initiate these interventions on an urgent basis would likely result in sudden, clinically significant or life-threatening deterioration in the patient's condition.    The critical condition was: ACS: STEMI/NSTEMI    Critical interventions performed or strongly considered: Arrange Definitive Care: OR/cath  lab/neurointervention/IR/endoscopy/dialysis/ICU    Critical care time was 15 minutes exclusive of time spent on separately billable procedures.    For purposes of time:  Procedures included in CC time: interpretation of NICOM, CXR, SpO2, VBG/ABG, interpretation of physiologic data, OG placement, temporary transcutaneous/transvenous pacing, ventilator management  Common separately billable procedures (not included in CC time): CPR, wound repair, endotracheal intubation, central line placement, intraosseous placement, tube thoracostomy, temporary transvenous pacemaker, EKG, electrical cardioversion      Medical Decision Makin-year-old male with history of tobacco use disorder who presented to the emergency department with report of chest pain also suicidal ideation. Patient was activated from the field as a STEMI alert.  On his arrival to the emergency department, EKG was repeated here which did show signs concerning for anterior STEMI.  Blood pressure stable, intermittently hypoxic this was placed on oxygen.  While awaiting Cath Lab, chest x-ray was obtained, no signs of widened mediastinum or focal consolidation or pneumothorax.  He received Brilinta, heparin, and an additional sublingual dose of nitroglycerin.  Of note, further clarification is obtained and patient actually took 5 pills of oxycodone and suicide attempt.  No signs of bradypnea or airway concern in the emergency department.  I did call and speak with Dr. Redman to convey this.  Patient was transported to the Cath Lab in stable condition.      Diagnosis:    ICD-10-CM    1. ST elevation MI (STEMI) (H)  I21.3 Cardiac Catheterization     Cardiac Catheterization           Scribe Disclosure:  IJony, am serving as a scribe at 4:53 PM on 2024 to document services personally performed by Jasmyne Appiah MD based on my observations and the provider's statements to me.     2024   Jasmyne Appiah MD Wu Klasek, Connie,  MD  01/23/24 2030       Jasmyne Appiah MD  01/23/24 2031

## 2024-01-23 NOTE — H&P
[facility]    History and Physical - Hospitalist Service       Date of Admission:  1/23/2024    Assessment & Plan      Mingo Melgoza is a 48 year old male admitted on 1/23/2024.  Past history of nephrolithiasis but without routine medical care.  He presents with chest pain and found to have STEMI, but also reported suicidal ideation and took 5 pills of oxycodone as part of intentional overdose attempt.       Anterior STEMI s/p PCI to mid LAD  *EKG not available on arrival to floor; was taken emergently to cath lab from ED and underwent stenting as noted with mild disease noted elsewhere  - aspirin, Brilinta, metoprolol, lisinopril, atorvastatin per Cards  - lipid panel  - echo    Suicidal ideation with attempted suicide by narcotic overdose  *took 5 pills hydrocodone-acetaminophen 5-325 mg about 0900 on 1/23/24 as intentional suicide attempt; denies other ingestion  *at admission, endorses regret for his actions and reports feeling safe in hospital  - telemetry, continuous pulse ox though should largely be cleared by now  - Utox (note will be positive for sedation given for cath), BMP, tylenol level  - suicide precautions  - Psych consult    Tobacco use disorder  *smokes 1ppd  - nicotine patch          Diet: Low Saturated Fat Na <2400 mg    DVT Prophylaxis: Pneumatic Compression Devices  Cook Catheter: Not present  Lines: PRESENT             Cardiac Monitoring: ACTIVE order. Indication: Post- PCI/Angiogram (24 hours)  Code Status: Full CodeFull code per patient     Clinically Significant Risk Factors Present on Admission                                  Disposition Plan      Expected Discharge Date: 01/24/2024                Jeffy Redman MD  Hospitalist Service  [facility]  Securely message with [employer] (more info)  Text page via [employer] Paging/Directory     ______________________________________________________________________    Chief Complaint   Chest pain  "    History is obtained from the patient, chart review and discussion with ED provider.     History of Present Illness   Mingo Melgoza is a 48 year old male who presents with chest pain.  He reports about 1600 today he experienced nausea, centralized chest discomfort described as a \"fist in your chest\" with associate dyspnea.  He called EMS and was taken emergently to cath lab where stent was placed.      Of note, he also reports feeling depressed for the past 2 months.  Reports long history of depression but states he has never really followed through with medication or therapy treatments.  Reports having the \"holiday blues\" but developed suicidal thoughts over the past 2 days.  He took 5 pills of hydrocodone-acetaminophen 5/325 that he had left over from prior procedure for nephrolithiasis.  He reports he became drowsy, but informed a friend of his actions who then called the police for a welfare check.  He opted not to seek psychiatric care after talking with police officers and currently reports regret for his action.        Past Medical History    Past Medical History:   Diagnosis Date    Major depression     Nephrolithiasis        Past Surgical History   History reviewed. No pertinent surgical history.    Prior to Admission Medications   None           Physical Exam   Vital Signs: Temp: 97.4  F (36.3  C) Temp src: Oral BP: 98/73 Pulse: 99   Resp: 11 SpO2: 97 % O2 Device: Nasal cannula Oxygen Delivery: 2 LPM  Weight: 131 lbs 13.36 oz    General Appearance: well nourished male in NAD  Eyes: PERRL, sclera anicteric   HEENT: mucous membranes moist  Respiratory: slight expiratory wheeze, no tachypnea or crackles  Cardiovascular: RRR, normal s1/s2 without murmur  GI: normal bowel sounds, nontender, nondistended  Lymph/Hematologic: no peripheral edema   Musculoskeletal: extremities warm and well perfused  Neurologic: alert and appropriate, CN grossly intact   Psychiatric: normal affect     Medical Decision Making "       60 MINUTES SPENT BY ME on the date of service doing chart review, history, exam, documentation & further activities per the note.      Data         Imaging results reviewed over the past 24 hrs:   Recent Results (from the past 24 hour(s))   XR Chest Port 1 View    Narrative    EXAM: XR CHEST PORT 1 VIEW  LOCATION: [facility]  DATE: 1/23/2024    INDICATION: Chest pain.  COMPARISON: None available.      Impression    IMPRESSION: Prominent aspect of both lung bases is excluded from field-of-view.    Within this limitation, lungs are clear. No visualized pleural effusion or pneumothorax.    Cardiomediastinal silhouette is normal.    Consider repeat imaging of the chest to include the entirety of the lung bases.   Cardiac Catheterization    Narrative    1.  Single-vessel occlusive coronary artery disease, 100% occlusion of mid   LAD  2.  Successful PCI of mid LAD with placement of a 3.0 x 22 mm resolute   Hobbs stent

## 2024-01-24 ENCOUNTER — APPOINTMENT (OUTPATIENT)
Dept: OCCUPATIONAL THERAPY | Facility: CLINIC | Age: 49
DRG: 322 | End: 2024-01-24
Attending: INTERNAL MEDICINE

## 2024-01-24 ENCOUNTER — APPOINTMENT (OUTPATIENT)
Dept: CARDIOLOGY | Facility: CLINIC | Age: 49
DRG: 322 | End: 2024-01-24
Attending: INTERNAL MEDICINE
Payer: COMMERCIAL

## 2024-01-24 LAB
AMPHETAMINES UR QL SCN: ABNORMAL
ANION GAP SERPL CALCULATED.3IONS-SCNC: 9 MMOL/L (ref 7–15)
BARBITURATES UR QL SCN: ABNORMAL
BENZODIAZ UR QL SCN: ABNORMAL
BUN SERPL-MCNC: 12.8 MG/DL (ref 6–20)
BZE UR QL SCN: ABNORMAL
CALCIUM SERPL-MCNC: 8.7 MG/DL (ref 8.6–10)
CANNABINOIDS UR QL SCN: ABNORMAL
CHLORIDE SERPL-SCNC: 105 MMOL/L (ref 98–107)
CHOLEST SERPL-MCNC: 136 MG/DL
CREAT SERPL-MCNC: 0.85 MG/DL (ref 0.67–1.17)
DEPRECATED HCO3 PLAS-SCNC: 26 MMOL/L (ref 22–29)
EGFRCR SERPLBLD CKD-EPI 2021: >90 ML/MIN/1.73M2
FENTANYL UR QL: ABNORMAL
GLUCOSE SERPL-MCNC: 79 MG/DL (ref 70–99)
HDLC SERPL-MCNC: 37 MG/DL
HGB BLD-MCNC: 12.8 G/DL (ref 13.3–17.7)
LDLC SERPL CALC-MCNC: 92 MG/DL
LVEF ECHO: NORMAL
NONHDLC SERPL-MCNC: 99 MG/DL
OPIATES UR QL SCN: ABNORMAL
PCP QUAL URINE (ROCHE): ABNORMAL
POTASSIUM SERPL-SCNC: 3.6 MMOL/L (ref 3.4–5.3)
SODIUM SERPL-SCNC: 140 MMOL/L (ref 135–145)
TRIGL SERPL-MCNC: 34 MG/DL
TROPONIN T SERPL HS-MCNC: 2080 NG/L

## 2024-01-24 PROCEDURE — 99233 SBSQ HOSP IP/OBS HIGH 50: CPT | Performed by: NURSE PRACTITIONER

## 2024-01-24 PROCEDURE — 99418 PROLNG IP/OBS E/M EA 15 MIN: CPT | Performed by: NURSE PRACTITIONER

## 2024-01-24 PROCEDURE — 210N000001 HC R&B IMCU HEART CARE

## 2024-01-24 PROCEDURE — 97165 OT EVAL LOW COMPLEX 30 MIN: CPT | Mod: GO

## 2024-01-24 PROCEDURE — 99233 SBSQ HOSP IP/OBS HIGH 50: CPT | Performed by: INTERNAL MEDICINE

## 2024-01-24 PROCEDURE — 93010 ELECTROCARDIOGRAM REPORT: CPT | Performed by: INTERNAL MEDICINE

## 2024-01-24 PROCEDURE — 250N000013 HC RX MED GY IP 250 OP 250 PS 637: Performed by: INTERNAL MEDICINE

## 2024-01-24 PROCEDURE — 255N000002 HC RX 255 OP 636: Performed by: INTERNAL MEDICINE

## 2024-01-24 PROCEDURE — 97110 THERAPEUTIC EXERCISES: CPT | Mod: GO

## 2024-01-24 PROCEDURE — 250N000013 HC RX MED GY IP 250 OP 250 PS 637

## 2024-01-24 PROCEDURE — 93306 TTE W/DOPPLER COMPLETE: CPT | Mod: 26 | Performed by: INTERNAL MEDICINE

## 2024-01-24 PROCEDURE — 80307 DRUG TEST PRSMV CHEM ANLYZR: CPT | Performed by: HOSPITALIST

## 2024-01-24 PROCEDURE — 84484 ASSAY OF TROPONIN QUANT: CPT | Performed by: INTERNAL MEDICINE

## 2024-01-24 PROCEDURE — 36415 COLL VENOUS BLD VENIPUNCTURE: CPT | Performed by: INTERNAL MEDICINE

## 2024-01-24 PROCEDURE — 80048 BASIC METABOLIC PNL TOTAL CA: CPT | Performed by: INTERNAL MEDICINE

## 2024-01-24 PROCEDURE — 85018 HEMOGLOBIN: CPT | Performed by: INTERNAL MEDICINE

## 2024-01-24 PROCEDURE — 93005 ELECTROCARDIOGRAM TRACING: CPT

## 2024-01-24 PROCEDURE — 99254 IP/OBS CNSLTJ NEW/EST MOD 60: CPT

## 2024-01-24 PROCEDURE — 999N000208 ECHOCARDIOGRAM COMPLETE

## 2024-01-24 PROCEDURE — 258N000003 HC RX IP 258 OP 636: Performed by: NURSE PRACTITIONER

## 2024-01-24 PROCEDURE — 97535 SELF CARE MNGMENT TRAINING: CPT | Mod: GO

## 2024-01-24 RX ADMIN — ASPIRIN 81 MG: 81 TABLET, COATED ORAL at 09:47

## 2024-01-24 RX ADMIN — SERTRALINE HYDROCHLORIDE 50 MG: 50 TABLET ORAL at 13:09

## 2024-01-24 RX ADMIN — SODIUM CHLORIDE, POTASSIUM CHLORIDE, SODIUM LACTATE AND CALCIUM CHLORIDE 250 ML: 600; 310; 30; 20 INJECTION, SOLUTION INTRAVENOUS at 10:04

## 2024-01-24 RX ADMIN — METOPROLOL TARTRATE 25 MG: 25 TABLET, FILM COATED ORAL at 19:52

## 2024-01-24 RX ADMIN — HUMAN ALBUMIN MICROSPHERES AND PERFLUTREN 9 ML: 10; .22 INJECTION, SOLUTION INTRAVENOUS at 15:39

## 2024-01-24 RX ADMIN — TICAGRELOR 90 MG: 90 TABLET ORAL at 18:02

## 2024-01-24 RX ADMIN — TICAGRELOR 90 MG: 90 TABLET ORAL at 06:33

## 2024-01-24 RX ADMIN — ROSUVASTATIN CALCIUM 20 MG: 20 TABLET, FILM COATED ORAL at 09:47

## 2024-01-24 ASSESSMENT — ACTIVITIES OF DAILY LIVING (ADL)
ADLS_ACUITY_SCORE: 18
ADLS_ACUITY_SCORE: 19
ADLS_ACUITY_SCORE: 18
ADLS_ACUITY_SCORE: 19
ADLS_ACUITY_SCORE: 19
PREVIOUS_RESPONSIBILITIES: MEAL PREP;HOUSEKEEPING;LAUNDRY;SHOPPING;MEDICATION MANAGEMENT;FINANCES;DRIVING;WORK
ADLS_ACUITY_SCORE: 19

## 2024-01-24 NOTE — DISCHARGE INSTRUCTIONS
[facility] [phone]  Mental Health & Addiction Services   CURRENT AVAILABILITY    Mental Health All Ages  Assessment and Referrals, Individual and Group Therapy, Psychiatry, Intensive Outpatient Program, Partial Hospitalization Program, Dual Diagnosis Program, and 55+ Seniors Program    Problem Gambling Treatment  Assessment and Referrals, Individual & Group Therapy, Rule 82, and Court Ordered.    Several Locations PLUS Telephone or Video Visits Offered     Behavioral Health Access   [phone]  *MyChart Appointments Available        Mental Health Resources:    Free Counseling Services  COUNSELING SERVICES ARE COMPLETELY FREE AND ANONYMOUS. [facility] is a non-profit, non-Oriental orthodox organization, All of our professional counselors volunteer their time. Counseling is for individuals, couples, or families. No appointment (during clinics) or insurance is needed.    CLINIC HOURS: Please come/call/login only during clinic hours.  M, W, F:  1:00p - 3:00p for both in-person and virtual (phone and login) services    M, T, W, Th: 5:30p - 7:30p virtual services only    IN PERSON SERVICES: Come to [address] [place] [address] on Monday, Wednesday or Friday from 1 - 3 pm.    BY PHONE: Call Zoom at [phone]. When prompted, enter the meeting ID: [phone].    BY COMPUTER: Go to [email or web address]    ONE TAP MOBILE ON [employer] PHONES: To attend a clinic using the smart cell phone  one-tap mobile  button, click on this link and press the dial button on your phone:  [phone]  When Zoom answers, it may ask for a meeting id (you won t have one), so just wait until it asks you to simply press the # (number) button.    If the phone line is busy, try the phone numbers below. Try each phone number. Or, use landline phone-in instructions below.  [phone]  [phone]  [phone]  [phone]  [phone]    Services for Slovak Speakers  Services for Slovak speakers  "remain the same.  The client can simply call our main number ([phone]), dial extension [phone] and leave a message with the call-back phone number.    Counseling by Appointment  If you are interested in ongoing counseling, the first step is to see a counselor at a clinic. Ongoing counseling is arranged by request during the clinic.    Free Counseling Services  Counseling services are completely free and anonymous, with no appointment needed. SOME CLINICS ARE NOW IN PERSON! All of our professional counselors volunteer their time.    [place] WARM LINE  [facility]  [phone] or [phone]  [email or web address]  [address], [address]  [facility] [address]  Call [phone]  Text \"Support\" to [phone]    CRISIS:  Text or Call 978       "

## 2024-01-24 NOTE — PROGRESS NOTES
[facility]     HOSPITALIST PROGRESS NOTE     Assessment/Plan  Mingo Melgoza is a 48 year old male with a significant past medical history for tobacco abuse, nephrolithiasis, and depression who presented to the emergency department via EMS due to chest pain EKG revealing STEMI.  Patient was found to have anterior MI upon presentation and brought to Cath Lab found to have one-vessel disease to mid LAD with placement of DAVIAN by .  Admitted by the hospitalist service also found to have active suicidal ideation with attempt of overdose via ingestion of hydrocodone.  Psychiatry consulted and awaiting further treatment and eval per their recommendations as of 1/24/2024.    1.  Anterior STEMI status post DAVIAN to the mid LAD  Coronary angio gram status post PCI completed by Dr. Harrison on 1/23/2024  Per cardiology services aspirin, lisinopril, metoprolol, lovastatin, and ticagrelor  Encourage smoking cessation patient states ready to quit  Lipids complete and notable only for low HDL of 37  Strong family history of cardiac disease  Echocardiogram pending  2.  Suicidal ideation with plan and overdose attempt  Patient reports episodes of situational as well as seasonal depression  Patient discussing future plans and reports strong support with his sister  Currently awaiting psychiatry consultation for further treatment and evaluation  3.  Mild anemia  Likely related to expected minimal loss from femoral approach PCI.  Asymptomatic however is a bit hypotensive  Monitor, no active bleeding  4.  Asymptomatic hypotension  Likely secondary from initiation of core measure beta-blockers and ACE inhibitor related to #1  Encourage oral fluid intake and will give one-time small  cc bolus  Nursing asking cardiology to reevaluate blood pressure medications as well.  5.  Tobacco abuse with continued use  Patient reports 1 pack/day for about 15 years  He endorses readiness to quit  Encouraged to ask for  supplements if needed currently not requiring.    Summary of Hospitalization  Admit Date: 1/23/2024  Mingo Melgoza is a 48 year old male with a significant past medical history for tobacco abuse, nephrolithiasis, and depression who presented to the emergency department via EMS due to chest pain EKG revealing STEMI.  Patient was found to have anterior MI upon presentation and brought to Cath Lab found to have one-vessel disease to mid LAD with placement of DAVIAN by .  Admitted by the hospitalist service also found to have active suicidal ideation as well with attempt of overdose via ingestion of hydrocodone.  Psychiatry consulted and evaluation currently pending.  Patient does have one-to-one sitter at this time.    Since admission, patient has had successful DAVIAN placement with symptom improvement.    SUBJECTIVE  Patient is seen in his room with one-to-one aide at bedside as well as physical therapy services.  Patient is alert, oriented, very pleasant at this time.  He denies any recent fever, chills, headache.  He denies any feeling currently of shortness of breath or chest pain.  He states he did not sleep very well last night due to necessitating laying on his back with the femoral insertion site from PCI, however is not having any distress, pain or discomfort at this time.  He denies any current nausea, vomiting, or abdominal pain.  He describes himself as very active and states that he eats healthy.  He has a very strong family history of cardiac disease to include his father, uncles, 1 uncle for which actually had a heart transplant.  He is not actively suicidal when I speak with him and states he had a long discussion with the sister and feels that he has good support at home.  He tells me he is ready to quit smoking and is looking forward to a future of better health.    OBJECTIVE  Vital sign ranges last 24 hours: Temp:  [97.4  F (36.3  C)-98.1  F (36.7  C)] 98.1  F (36.7  C)  Pulse:  []  76  Resp:  [8-65] 19  BP: ()/(55-81) 97/55  SpO2:  [89 %-99 %] 93 %      Intake/Output Summary (Last 24 hours) at 2024 0711  Last data filed at 2024 0457  Gross per 24 hour   Intake 120 ml   Output 350 ml   Net -230 ml       VITALS: Reviewed as above.    Exam:  Constitutional: healthy, alert, and no distress  Head: Normocephalic. No masses, lesions, tenderness or abnormalities  Neck: Neck supple. No adenopathy. Thyroid symmetric, normal size,, Carotids without bruits.  ENT: ENT exam normal, no neck nodes or sinus tenderness  Cardiovascular: negative, PMI normal. No lifts, heaves, or thrills. RRR. No murmurs, clicks gallops or rub  Respiratory: negative, negative findings: chest symmetric with normal A/P diameter, normal respiratory rate and rhythm, lungs clear to auscultation  Gastrointestinal: negative findings: no scars, striae, dilated veins, rashes, or lesions, bowel sounds normal  : Deferred  Musculoskeletal: extremities normal- no gross deformities noted, gait normal, and normal muscle tone  Skin: no suspicious lesions or rashes  Neurologic: negative findings: cranial nerves 3-12 intact, muscle strength normal, reflexes normal and symmetric  Psychiatric: mentation appears normal and affect normal/bright  Hematologic/Lymphatic/Immunologic: Negative     DATA:   Notable labs as of 2024 troponin downward trending , hemoglobin 12.8.    MEDICAL DECISION MAKIN minutes spent with patient reviewing labs, diagnostics, discussing treatment plan and anticipated evaluations.    DISPOSITION: Home with plan for follow-up to cardiology, cardiac rehab, and primary care provider.  Discharge disposition pending evaluation by psychiatry services and cardiology rounding.    The above plan was discussed with Dr. Ocampo. Thank you for allowing us to be involved in the care of this patient.     Majo Ayala, DNP,APRN CNP

## 2024-01-24 NOTE — PLAN OF CARE
Alert and oriented x 4. VS stable, on RA and no complaint of chest pain ore pressure. Tele SR 's. Bed rest ongoing. ACT drawn at 1930, over 180 so lab will be back at 2030. Groin site is clean and no bleeding or hematoma. Plan for Echo and cardiac rehab tomorrow.

## 2024-01-24 NOTE — PROGRESS NOTES
Assessment and Plan:   Mingo Melgoza is a 48 year old male who was admitted on 1/23/2024.    Anterior STEMI  Mild anemia    Recommendation  1.  Continue DAPT uninterrupted for at least 1 year.  2.  Check lipoprotein a as he does not have any significant coronary risk factors.  3.  Will follow-up on the results of echocardiogram.        Interval History:     No significant overnight issues.  No chest pain          Medications:      aspirin  81 mg Oral Daily    metoprolol tartrate  25 mg Oral BID    nicotine  1 patch Transdermal Daily    nicotine   Transdermal Q8H    rosuvastatin  20 mg Oral Daily    sodium chloride (PF)  3 mL Intracatheter Q8H    ticagrelor  90 mg Oral Q12H            Physical Exam:   Patient Vitals for the past 24 hrs:   BP Temp Temp src Pulse Resp SpO2 Weight   01/24/24 1030 (!) 128/90 -- -- 80 -- 96 % --   01/24/24 0930 (!) 85/57 -- -- -- -- 92 % --   01/24/24 0800 97/53 -- -- 82 17 92 % --   01/24/24 0603 97/55 -- -- 76 19 93 % --   01/24/24 0600 (!) 83/59 -- -- 71 13 94 % --   01/24/24 0550 -- -- -- 76 19 92 % --   01/24/24 0540 -- -- -- 74 16 92 % --   01/24/24 0530 -- -- -- 75 17 93 % --   01/24/24 0430 -- -- -- -- -- 94 % --   01/24/24 0420 -- -- -- -- -- (!) 89 % --   01/24/24 0400 100/64 -- -- 73 18 92 % --   01/24/24 0200 96/65 -- -- 75 16 94 % --   01/24/24 0000 99/68 -- -- 75 15 94 % --   01/23/24 2320 111/67 -- -- 76 16 93 % --   01/23/24 2315 110/74 -- -- 81 16 94 % --   01/23/24 2300 112/76 -- -- 93 19 95 % --   01/23/24 2255 112/74 -- -- 81 11 -- --   01/23/24 2250 110/80 -- -- 92 10 -- --   01/23/24 2245 118/75 -- -- 80 11 95 % --   01/23/24 2240 102/77 -- -- 76 13 95 % --   01/23/24 2235 112/71 -- -- 81 16 94 % --   01/23/24 2219 -- 98.1  F (36.7  C) Oral -- -- -- --   01/23/24 2215 -- -- -- 87 14 95 % --   01/23/24 2210 -- -- -- 76 14 94 % --   01/23/24 2205 -- -- -- 75 15 95 % --   01/23/24 2200 106/69 -- -- 80 15 94 % --   01/23/24 2155 -- -- -- 86 14 93 % --   01/23/24  2150 -- -- -- 85 15 93 % --   01/23/24 2145 -- -- -- 82 14 94 % --   01/23/24 2140 -- -- -- 84 16 93 % --   01/23/24 2135 -- -- -- 74 12 94 % --   01/23/24 2130 -- -- -- 76 14 94 % --   01/23/24 2125 -- -- -- 87 18 94 % --   01/23/24 2120 -- -- -- 79 (!) 8 94 % --   01/23/24 2115 -- -- -- 95 17 95 % --   01/23/24 2110 -- -- -- 90 21 95 % --   01/23/24 2105 -- -- -- 89 (!) 8 95 % --   01/23/24 2100 102/69 -- -- 83 12 95 % --   01/23/24 2055 -- -- -- 80 10 95 % --   01/23/24 2050 -- -- -- 91 11 92 % --   01/23/24 2048 -- -- -- -- -- (!) 89 % --   01/23/24 2045 -- -- -- 92 16 (!) 89 % --   01/23/24 2040 -- -- -- 90 16 (!) 89 % --   01/23/24 2035 -- -- -- 92 15 (!) 89 % --   01/23/24 2030 101/65 -- -- 92 15 90 % --   01/23/24 2025 -- -- -- 93 16 91 % --   01/23/24 2020 -- -- -- 81 (!) 31 91 % --   01/23/24 2015 -- -- -- 93 13 92 % --   01/23/24 2010 -- -- -- 105 (!) 65 94 % --   01/23/24 2005 -- -- -- 101 23 94 % --   01/23/24 2000 93/68 -- -- 98 16 96 % --   01/23/24 1930 107/69 -- -- 81 15 99 % --   01/23/24 1900 106/70 -- -- 96 10 -- --   01/23/24 1830 98/73 -- -- 99 11 -- --   01/23/24 1815 108/71 -- -- 83 13 97 % --   01/23/24 1800 105/64 97.4  F (36.3  C) Oral 73 -- -- --   01/23/24 1740 -- -- -- -- 12 -- --   01/23/24 1731 -- -- -- -- 12 -- --   01/23/24 1720 -- -- -- -- 17 -- --   01/23/24 1708 -- -- -- -- 18 -- --   01/23/24 1656 -- 98.1  F (36.7  C) -- -- -- -- --   01/23/24 1655 -- -- -- -- -- -- 59.8 kg (131 lb 13.4 oz)   01/23/24 1650 118/72 -- -- 73 22 90 % --   01/23/24 1648 110/80 -- -- 73 13 92 % --   01/23/24 1646 112/81 -- -- 90 -- 92 % --   01/23/24 1500 -- -- -- -- -- -- 59.8 kg (131 lb 13.4 oz)     Vitals:    01/23/24 1500 01/23/24 1655   Weight: 59.8 kg (131 lb 13.4 oz) 59.8 kg (131 lb 13.4 oz)         Intake/Output Summary (Last 24 hours) at 1/24/2024 1135  Last data filed at 1/24/2024 0457  Gross per 24 hour   Intake 120 ml   Output 350 ml   Net -230 ml       01/19 0700 - 01/24 0659  In: 120  "[P.O.:120]  Out: 350 [Urine:350]  Net: -230    Exam:  General alert oriented x3 no acute distress  Respirations CTAB   Cardiovascular S1 s2 normal, no murmurs  Abdomen nondistended   Psych appropriate affect         Data:   LABS (Last four results)  CMP  Recent Labs   Lab 01/24/24  0547 01/23/24  1648    139   POTASSIUM 3.6 3.8   CHLORIDE 105 100   CO2 26 28   ANIONGAP 9 11   GLC 79 174*   BUN 12.8 10.5   CR 0.85 0.87   GFRESTIMATED >90 >90   LEE 8.7 9.2     CBC  Recent Labs   Lab 01/24/24  0547 01/23/24  1648   WBC  --  11.4*   RBC  --  4.63   HGB 12.8* 14.8   HCT  --  43.3   MCV  --  94   MCH  --  32.0   MCHC  --  34.2   RDW  --  12.5   PLT  --  232     INRNo lab results found in last 7 days.  TROPONINS No results found for: \"TROPI\", \"TROPONIN\", \"TROPR\", \"TROPN\"                                                                                                         JOSEPH Bailey, [facility]  Cardiology, [facility] [place]    This note was completed in part using dictation via the Dragon voice recognition software. Although reviewed after completion, some word and grammatical errors may occur and must be interpreted in the appropriate clinical context.  If there are any questions pertaining to this issue, please contact me for further clarification or information.    "

## 2024-01-24 NOTE — PROGRESS NOTES
01/24/24 0836   Appointment Info   Signing Clinician's Name / Credentials (OT) No Lucas OTR/L   Living Environment   People in Home alone   Current Living Arrangements house  (Rambler style home with basement)   Home Accessibility stairs to enter home;stairs within home   Number of Stairs, Main Entrance 4   Number of Stairs, Within Home, Primary   (12 stairs to basement)   Transportation Anticipated   (Pt typically drives)   Living Environment Comments Pt reported that he is in the process of moving into his sister's home.   Self-Care   Usual Activity Tolerance good   Current Activity Tolerance moderate   Equipment Currently Used at Home none   Fall history within last six months no   Activity/Exercise/Self-Care Comment Independent in all ADLs and mobility at baseline.   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  Past history of nephrolithiasis but without routine medical care.  He presents with chest pain and found to have STEMI, but also reported suicidal ideation and took 5 pills of oxycodone as part of intentional overdose attempt. Anterior STEMI s/p PCI to mid LAD. See chart for details.   Heart Disease Risk Factors Family history   Cognitive Status Examination   Orientation Status orientation to person, place and time   Bed Mobility   Bed Mobility No deficits identified   Transfers   Transfers No deficits identified   Balance   Balance Assessment no deficits identified   Clinical Impression   Criteria for Skilled Therapeutic  Interventions Met (OT) Yes, treatment indicated   OT Diagnosis Decreased activity tolerance for I/ADLs   Influenced by the following impairments Decreased activity tolerance for I/ADLs   OT Problem List-Impairments impacting ADL problems related to;activity tolerance impaired   Assessment of Occupational Performance 1-3 Performance Deficits   Identified Performance Deficits Decreased activity tolerance for I/ADLs (Dressing, bathing, toileting, work, home management tasks)   Planned Therapy Interventions (OT) ADL retraining;IADL retraining;transfer training;home program guidelines;progressive activity/exercise;risk factor education   Clinical Decision Making Complexity (OT) problem focused assessment/low complexity   Risk & Benefits of therapy have been explained evaluation/treatment results reviewed;care plan/treatment goals reviewed;risks/benefits reviewed;patient   OT Total Evaluation Time   OT Eval, Low Complexity Minutes (53998) 7   OT Goals   Therapy Frequency (OT) 2 times/day   OT Predicted Duration/Target Date for Goal Attainment 01/25/24   OT Goals Cardiac Phase 1   OT: Understanding of cardiac education to maximize quality of life, condition management, and health outcomes Patient;Verbalize;Demonstrate   OT: Perform aerobic activity with stable cardiovascular response continuous;30 minutes;ambulation;treadmill   OT: Navigation of stairs simulating home set up with stable cardiovascular response in order to return to home and community environment Modified independent;Greater than 10 stairs   Interventions   Interventions Quick Adds Self-Care/Home Management;Therapeutic Procedures/Exercise;Cardiac Rehab   Self-Care/Home Management   Self-Care/Home Mgmt/ADL, Compensatory, Meal Prep Minutes (18158) 15   Treatment Detail/Skilled Intervention Educated on MI education and concepts. Educated on risk factors, resuming activities safely, walking HEP and safe progression, OMNI Effort Scale. See below for details.    Therapeutic Procedures/Exercise   Therapeutic Procedure: strength, endurance, ROM, flexibillity minutes (85402) 10   Symptoms Noted During/After Treatment fatigue  (lightheaded)   Treatment Detail/Skilled Intervention Session included set up, monitoring vitals throughout session, rest breaks, and symptoms. Pt completed 2 standing calisthenics exercises for 2 sets of 10 reps each exercise. Blood pressure lower with activity (see vs for details) and pt reporting some lightheadedness. Unable to complete timed ambulation. No LOB with activity.   Treatment Time Includes (CR Only) See specific exercise details intervention group(s);Monitoring of vital signs (see vital signs flowsheet for details)   Calisthenics   Type March in place;Hip Abductors   Workload Pt completed 2 standing calisthenics exercises for 2 sets of 10 reps each exercise.   Effort Scale 2   Symptoms Lightheadedness;Fatigue   Cardiovascular Response Hypotension at rest;Hypotensive response to exercise   Vital Signs Details See Vitals flow sheet for details.   Cardiac Education   Education Provided Home exercise program;OMNI Scale;Outpatient Cardiac Rehab;Precautions;Resuming home activities;Risk factors;Smoking cessation;Stop light tool   Education Packet Given to Patient Yes   All Patient Education Handouts Reviewed with Patient and/or Family Yes   Cardiac Rehab Phase II Plan   Phase II Order Received No   OT Discharge Planning   OT Plan Timed ambulation; stairs   OT Discharge Recommendation (DC Rec) home with outpatient cardiac rehab;home with assist   OT Rationale for DC Rec Pt limited this session by low blood pressure. Anticipate home with assist in I/ADLs as needed and  OP CR for monitored and progressive physical activity and education.   OT Brief overview of current status Standing calisthenics completed this session, limited by low BP and lightheadedness.   Total Session Time   Timed Code Treatment Minutes 25   Total Session Time (sum of timed  and untimed services) 32

## 2024-01-24 NOTE — PROVIDER NOTIFICATION
MD Notification    Notified Person: MD    Notified Person Name: Majo Ayala    Notification Date/Time: 01/24/2024 5263    Notification Interaction: vocera    Purpose of Notification: pt having soft BPs. do we want to start fluids?     Have cards review BP meds. Encourage oral fluid intake. Give him a small bolus.    Comments:

## 2024-01-24 NOTE — PLAN OF CARE
A&ox4. VSS. 1L NC w sleeping, able to wean. No CP/SOB reported. Hacking cough with activity, baseline per pt. LS wheezy w bedrest. Sheath pulled ~2240 and off bedrest ~0430. Able to void. R groin site - puffy and soft. Ambulating well to bathroom.   Plan: echo and cardiac rehah, psych c/s

## 2024-01-24 NOTE — PLAN OF CARE
Plan of Care Reviewed With: patient  Goal Outcome Evaluation:  Date/Time: 1/24  Summary: Suicide attempt w/hydromorphone w/chest pain-->STEMI (stent to LAD)  Precautions: Suicide sit d/c'd today per psych   Cognitive Concerns/Orientation: A/Ox4  Behavior and Aggression Color: Calm, quiet & cooperative  ABNL VS/O2: Vitals q2, Low SBP 80's-90's in am, one time 250ml bolus given, Lisiniopril d/c'd, & Metoprolol held. 's-120's. Cardiac MD consult to review BP meds  CMS: WDL's for right femoral groin access & pulses   Edema: NA  Mobility: Independent in room  Pain Management: denies pain  Diet: Low saturated/NA  Bowel/Bladder: WDL's  ABNL Labs/BG: NA  Drain/Devices: PIV SL  Telemetry Rhythm: SR  Skin: WDL's right groin site & pedal pulses  Tests/Procedures: Echo completed  Discharge Date: 1/24  Other Info:  1/24 Zoloft started  Resources for outpatient follow up w/psychiatry, therapy, and gambling addiction to be input into AVS by psychiatry.

## 2024-01-24 NOTE — CONSULTS
CLINICAL NUTRITION SERVICES - BRIEF NOTE    - Provider Order - Dietitian to see for Heart Healthy Diet education   - Patient presented with suicidal ideation, intentional overdose. Found to have STEMI.  - Psych consulted. Does not seem appropriate for Heart Healthy diet education at this time. Will see prior to discharge as appropriate.    Clarita Rutledge RD, LD  Clinical Dietitian - [facility]

## 2024-01-24 NOTE — PHARMACY-ADMISSION MEDICATION HISTORY
Pharmacist Admission Medication History    Admission medication history is complete. The information provided in this note is only as accurate as the sources available at the time of the update.    Information Source(s): Patient and CareEverywhere/SureScripts via in-person    Changes made to PTA medication list:  Added: None  Deleted: None  Changed: None      Medication History Completed By: Velia Argueta RPH 1/23/2024 7:01 PM    Prior to Admission medications    Medication Sig Last Dose Taking? Auth Provider Long Term End Date   No active medications

## 2024-01-24 NOTE — CONSULTS
Initial Psychiatric Consult   Consult date: January 24, 2024         Reason for Consult, requesting source:    Depression with suicide attempt by overdose  Requesting source: hospitalist    Labs and imaging reviewed. Discussed with nursing        HPI:   Mingo Melgoza is a 48 year old male with a history of depression and gambling addiction who presented on 1/23/24 with chest pain found to have STEMI. Earlier in the day he had attempted suicide by overdose on 5 pills of oxycodone, he had sent a concerning text to a friend who then called police for welfare check. Police did come out to meet with Mingo and no further intervention was required. Psychiatry is consulted to evaluate depression and suicidal ideation.    I met with Mingo in his room, his ex-wife and son were visiting but left the room for privacy. Mingo openly discusses his recent struggles with depression and gambling addiction. He has  long history of gambling addiction, beginning around age 23. He had a long period of recovery, over 10 years, but relapsed into gambling during the pandemic when businesses began to re-open. He now is spending about 8 hours several times per week at the [employer], losing money and causing problems with finances and work. 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 His business model is to live in the house during remodel, so he is frequently moving and living in stressful conditions. He thinks this process of constantly moving and living in middle of projects is wearing him down. He describes his mood as depressed, but also feels numb, unable to feel other emotions. He has been feeling increasingly depressed and began having suicidal ideation. He has never attempted suicide in the past, but does endorse a history of suicidal ideation/threats. States that when he took the oxycodone earlier in the day  of admission, he had asked for some kind of sign and a few hours later had a heart attack, which he believes was his sign to stop living the way he was and start making healthy changes. He is motivated to stop gambling, plans to begin attending GA meetings. He hopes that this will be adequate, but is open to considering more intensive treatment program if this is not the case. He would also like to begin treatment for depression with both medications and therapy. He thinks he was prescribed Lexapro in the past but did not take it long enough to determine if beneficial.        Past Psychiatric History:   History of depression, gambling addiction. Inadequate past trial of escitalopram.    He endorses past episodes of requiring little sleep (2-3 hours per night), risky sexual behaviors, impulsivity, elevated mood which lasts about 2-3 days, however these episodes are always related to times when he is gambling and having a winning streak. He relates these episodes to being energized by the wins, and states that they can stop abruptly if he starts doing poorly at the casino. He also reports that during bad runs at the casino, his mood drops dramatically and he will sleep excessively.         Substance Use and History:   Tobacco only, denies alcohol or other substance use.        Past Medical History:   PAST MEDICAL HISTORY:   Past Medical History:   Diagnosis Date    Major depression     Nephrolithiasis        PAST SURGICAL HISTORY:   Past Surgical History:   Procedure Laterality Date    CV CORONARY ANGIOGRAM N/A 1/23/2024    Procedure: Coronary Angiogram;  Surgeon: Scott Harrison MD;  Location: [facility] CARDIAC CATH LAB    CV PCI N/A 1/23/2024    Procedure: Percutaneous Coronary Intervention;  Surgeon: Scott Harrison MD;  Location: [facility] CARDIAC CATH LAB             Family History:   FAMILY HISTORY:   Family History   Problem Relation Age of Onset    Coronary Artery Disease Mother 45    Breast  Cancer Mother     Hypertension Mother     Coronary Artery Disease Father 55    Hypertension Father     Diabetes Type 2  Father            Social History:   SOCIAL HISTORY:   Social History     Tobacco Use    Smoking status: Every Day     Packs/day: 1.00     Years: 15.00     Additional pack years: 0.00     Total pack years: 15.00     Types: Cigarettes    Smokeless tobacco: Never   Substance Use Topics    Alcohol use: Never     , with two adult children. 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         Physical ROS:   The 10 point Review of Systems is negative other than noted in the HPI or here.           Medications:      aspirin  81 mg Oral Daily    metoprolol tartrate  25 mg Oral BID    nicotine  1 patch Transdermal Daily    nicotine   Transdermal Q8H    rosuvastatin  20 mg Oral Daily    sodium chloride (PF)  3 mL Intracatheter Q8H    ticagrelor  90 mg Oral Q12H              Allergies:   No Known Allergies       Labs:     Recent Results (from the past 48 hour(s))   Extra Blue Top Tube    Collection Time: 01/23/24  4:48 PM   Result Value Ref Range    Hold Specimen JIC    Extra Red Top Tube    Collection Time: 01/23/24  4:48 PM   Result Value Ref Range    Hold Specimen JIC    Extra Green Top (Lithium Heparin) Tube    Collection Time: 01/23/24  4:48 PM   Result Value Ref Range    Hold Specimen JIC    Extra Purple Top Tube    Collection Time: 01/23/24  4:48 PM   Result Value Ref Range    Hold Specimen JIC    Acetaminophen level    Collection Time: 01/23/24  4:48 PM   Result Value Ref Range    Acetaminophen 8.0 (L) 10.0 - 30.0 ug/mL   Basic metabolic panel    Collection Time: 01/23/24  4:48 PM   Result Value Ref Range    Sodium 139 135 - 145 mmol/L    Potassium 3.8 3.4 - 5.3 mmol/L    Chloride 100 98 - 107 mmol/L    Carbon Dioxide (CO2) 28 22 - 29 mmol/L    Anion Gap 11 7 - 15 mmol/L    Urea Nitrogen 10.5 6.0 - 20.0 mg/dL    Creatinine 0.87 0.67 - 1.17 mg/dL    GFR Estimate >90 >60 mL/min/1.73m2    Calcium 9.2 8.6 -  10.0 mg/dL    Glucose 174 (H) 70 - 99 mg/dL   CBC with platelets    Collection Time: 01/23/24  4:48 PM   Result Value Ref Range    WBC Count 11.4 (H) 4.0 - 11.0 10e3/uL    RBC Count 4.63 4.40 - 5.90 10e6/uL    Hemoglobin 14.8 13.3 - 17.7 g/dL    Hematocrit 43.3 40.0 - 53.0 %    MCV 94 78 - 100 fL    MCH 32.0 26.5 - 33.0 pg    MCHC 34.2 31.5 - 36.5 g/dL    RDW 12.5 10.0 - 15.0 %    Platelet Count 232 150 - 450 10e3/uL   Activated clotting time celite, POCT    Collection Time: 01/23/24  5:15 PM   Result Value Ref Range    Activated Clotting Time (Celite) POCT 189 (H) 74 - 150 seconds   Activated clotting time celite, POCT    Collection Time: 01/23/24  5:29 PM   Result Value Ref Range    Activated Clotting Time (Celite) POCT 255 (H) 74 - 150 seconds   EKG 12-lead, tracing only    Collection Time: 01/23/24  6:57 PM   Result Value Ref Range    Systolic Blood Pressure  mmHg    Diastolic Blood Pressure  mmHg    Ventricular Rate 72 BPM    Atrial Rate 72 BPM    CA Interval 140 ms    QRS Duration 78 ms     ms    QTc 405 ms    P Axis 79 degrees    R AXIS 74 degrees    T Axis 76 degrees    Interpretation ECG       Sinus rhythm  Septal infarct , age undetermined  Abnormal ECG  When compared with ECG of 23-JAN-2024 16:42, (unconfirmed)  QRS axis Shifted right  Septal infarct is now Present  ST no longer elevated in Anterior leads     Activated clotting time celite, POCT    Collection Time: 01/23/24  7:27 PM   Result Value Ref Range    Activated Clotting Time (Celite) POCT 213 (H) 74 - 150 seconds   iSTAT ACT-C Request Only    Collection Time: 01/23/24  7:31 PM   Result Value Ref Range    ISTAT ACT REQUEST ONLY COL    iSTAT ACT-C Request Only    Collection Time: 01/23/24  7:31 PM   Result Value Ref Range    ISTAT ACT REQUEST ONLY COL    Activated clotting time celite, POCT    Collection Time: 01/23/24  9:01 PM   Result Value Ref Range    Activated Clotting Time (Celite) POCT 178 (H) 74 - 150 seconds   Lipid Profile     Collection Time: 01/23/24  9:05 PM   Result Value Ref Range    Cholesterol 136 <200 mg/dL    Triglycerides 34 <150 mg/dL    Direct Measure HDL 37 (L) >=40 mg/dL    LDL Cholesterol Calculated 92 <=100 mg/dL    Non HDL Cholesterol 99 <130 mg/dL   Troponin T, High Sensitivity    Collection Time: 01/23/24  9:05 PM   Result Value Ref Range    Troponin T, High Sensitivity 2,522 (HH) <=22 ng/L   iSTAT ACT-C Request Only    Collection Time: 01/23/24  9:05 PM   Result Value Ref Range    ISTAT ACT REQUEST ONLY COL    Urine Drug Screen Panel    Collection Time: 01/24/24  4:56 AM   Result Value Ref Range    Amphetamines Urine Screen Negative Screen Negative    Barbituates Urine Screen Negative Screen Negative    Benzodiazepine Urine Screen Negative Screen Negative    Cannabinoids Urine Screen Negative Screen Negative    Cocaine Urine Screen Negative Screen Negative    Fentanyl Qual Urine Screen Negative Screen Negative    Opiates Urine Screen Positive (A) Screen Negative    PCP Urine Screen Negative Screen Negative   Basic metabolic panel    Collection Time: 01/24/24  5:47 AM   Result Value Ref Range    Sodium 140 135 - 145 mmol/L    Potassium 3.6 3.4 - 5.3 mmol/L    Chloride 105 98 - 107 mmol/L    Carbon Dioxide (CO2) 26 22 - 29 mmol/L    Anion Gap 9 7 - 15 mmol/L    Urea Nitrogen 12.8 6.0 - 20.0 mg/dL    Creatinine 0.85 0.67 - 1.17 mg/dL    GFR Estimate >90 >60 mL/min/1.73m2    Calcium 8.7 8.6 - 10.0 mg/dL    Glucose 79 70 - 99 mg/dL   Hemoglobin    Collection Time: 01/24/24  5:47 AM   Result Value Ref Range    Hemoglobin 12.8 (L) 13.3 - 17.7 g/dL   Troponin T, High Sensitivity    Collection Time: 01/24/24  5:47 AM   Result Value Ref Range    Troponin T, High Sensitivity 2,080 (HH) <=22 ng/L   EKG 12-lead, tracing only    Collection Time: 01/24/24  7:28 AM   Result Value Ref Range    Systolic Blood Pressure  mmHg    Diastolic Blood Pressure  mmHg    Ventricular Rate 77 BPM    Atrial Rate 77 BPM    UT Interval 138 ms    QRS  Duration 86 ms     ms    QTc 416 ms    P Axis 83 degrees    R AXIS 37 degrees    T Axis 82 degrees    Interpretation ECG       Sinus rhythm with marked sinus arrhythmia  Anteroseptal infarct (cited on or before 23-JAN-2024)  Abnormal ECG  When compared with ECG of 23-JAN-2024 18:57, (unconfirmed)  No significant change was found            Physical and Psychiatric Examination:     /85 (BP Location: Right arm)   Pulse 79   Temp 98.3  F (36.8  C) (Oral)   Resp 16   Wt 59.8 kg (131 lb 13.4 oz)   SpO2 95%   Weight is 131 lbs 13.36 oz  There is no height or weight on file to calculate BMI.    Physical Exam:  I have reviewed the physical exam as documented by by the medical team and agree with findings and assessment and have no additional findings to add at this time.    Mental Status Exam:    Appearance: awake, alert and appeared as age stated  Attitude:  cooperative  Eye Contact:  fair  Mood:  depressed  Affect:  mood congruent and intensity is blunted  Speech:  clear, coherent  Psychomotor Behavior:  no evidence of tardive dyskinesia, dystonia, or tics  Thought Process:  logical, linear, and goal oriented  Associations:  no loose associations  Thought Content:  no evidence of suicidal ideation or homicidal ideation and no evidence of psychotic thought  Insight:  good  Judgement:  intact  Oriented to:  time, person, and place  Attention Span and Concentration:  intact  Recent and Remote Memory:  intact               DSM-5 Diagnosis:   Gambling addiction  Unspecified depression  R/O bipolar disorder          Assessment:   Mingo Melgoza is a 48 year old male with a history of depression and gambling addiction who recently attempted suicide by overdosing on 5 pills of oxycodone. He denies any ongoing suicidal ideation, no intent or plan to harm himself. He is future oriented and making plans to change his lifestyle. He has discussed his mental health concerns with family and friends. He is currently  "planning to stay with his sister at discharge for a few weeks for increased support and to get him out of current living situation. He does not require ongoing suicide precautions or one to one sitter. He would like to start antidepressant, recommended sertraline due to cardiac safety profile. He is also interested in getting established with psychiatrist and therapist, though he does not have insurance at this time so we are unable to schedule appointment for him. Resources for psychiatry, therapy, and gambling addiction treatment will be placed in AVS.    He does endorse a history of manic-like symptoms, though he describes these as only ever occurring when \"buzzed\" from a series of wins at the [employer] which seems plausible. It is worth monitoring for future manic symptoms outside of gambling, especially with initiation of SSRI.            Summary of Recommendations:   Start sertraline 50mg daily for depression  Suicide precautions discontinued; sitter not needed.  Resources for outpatient follow up w/psychiatry, therapy, and gambling addiction to be input into AVS.       MAKENNA Lindquist CNP    Consult/Liaison Psychiatry   [facility]    Contact information available via [facility] Paging/Directory  If I am not available, then [facility] CL line ([phone]) should know who is covering our consult service.            "

## 2024-01-25 ENCOUNTER — APPOINTMENT (OUTPATIENT)
Dept: OCCUPATIONAL THERAPY | Facility: CLINIC | Age: 49
DRG: 322 | End: 2024-01-25
Attending: INTERNAL MEDICINE

## 2024-01-25 ENCOUNTER — APPOINTMENT (OUTPATIENT)
Dept: OCCUPATIONAL THERAPY | Facility: CLINIC | Age: 49
DRG: 322 | End: 2024-01-25
Payer: COMMERCIAL

## 2024-01-25 VITALS
TEMPERATURE: 97.8 F | DIASTOLIC BLOOD PRESSURE: 68 MMHG | WEIGHT: 121.4 LBS | RESPIRATION RATE: 18 BRPM | HEIGHT: 69 IN | SYSTOLIC BLOOD PRESSURE: 104 MMHG | BODY MASS INDEX: 17.98 KG/M2 | HEART RATE: 64 BPM | OXYGEN SATURATION: 97 %

## 2024-01-25 LAB
ANION GAP SERPL CALCULATED.3IONS-SCNC: 11 MMOL/L (ref 7–15)
ATRIAL RATE - MUSE: 72 BPM
BASOPHILS # BLD AUTO: 0 10E3/UL (ref 0–0.2)
BASOPHILS NFR BLD AUTO: 1 %
BUN SERPL-MCNC: 16.5 MG/DL (ref 6–20)
CALCIUM SERPL-MCNC: 8.9 MG/DL (ref 8.6–10)
CHLORIDE SERPL-SCNC: 102 MMOL/L (ref 98–107)
CREAT SERPL-MCNC: 0.87 MG/DL (ref 0.67–1.17)
DEPRECATED HCO3 PLAS-SCNC: 26 MMOL/L (ref 22–29)
DIASTOLIC BLOOD PRESSURE - MUSE: NORMAL MMHG
EGFRCR SERPLBLD CKD-EPI 2021: >90 ML/MIN/1.73M2
EOSINOPHIL # BLD AUTO: 0.2 10E3/UL (ref 0–0.7)
EOSINOPHIL NFR BLD AUTO: 3 %
ERYTHROCYTE [DISTWIDTH] IN BLOOD BY AUTOMATED COUNT: 12.3 % (ref 10–15)
GLUCOSE BLDC GLUCOMTR-MCNC: 163 MG/DL (ref 70–99)
GLUCOSE SERPL-MCNC: 64 MG/DL (ref 70–99)
HCT VFR BLD AUTO: 40.2 % (ref 40–53)
HGB BLD-MCNC: 13.8 G/DL (ref 13.3–17.7)
IMM GRANULOCYTES # BLD: 0 10E3/UL
IMM GRANULOCYTES NFR BLD: 0 %
INTERPRETATION ECG - MUSE: NORMAL
LYMPHOCYTES # BLD AUTO: 1.6 10E3/UL (ref 0.8–5.3)
LYMPHOCYTES NFR BLD AUTO: 24 %
MCH RBC QN AUTO: 32 PG (ref 26.5–33)
MCHC RBC AUTO-ENTMCNC: 34.3 G/DL (ref 31.5–36.5)
MCV RBC AUTO: 93 FL (ref 78–100)
MONOCYTES # BLD AUTO: 0.6 10E3/UL (ref 0–1.3)
MONOCYTES NFR BLD AUTO: 9 %
NEUTROPHILS # BLD AUTO: 4.4 10E3/UL (ref 1.6–8.3)
NEUTROPHILS NFR BLD AUTO: 63 %
NRBC # BLD AUTO: 0 10E3/UL
NRBC BLD AUTO-RTO: 0 /100
P AXIS - MUSE: 79 DEGREES
PLATELET # BLD AUTO: 197 10E3/UL (ref 150–450)
POTASSIUM SERPL-SCNC: 4.3 MMOL/L (ref 3.4–5.3)
PR INTERVAL - MUSE: 140 MS
QRS DURATION - MUSE: 78 MS
QT - MUSE: 370 MS
QTC - MUSE: 405 MS
R AXIS - MUSE: 74 DEGREES
RBC # BLD AUTO: 4.31 10E6/UL (ref 4.4–5.9)
SODIUM SERPL-SCNC: 139 MMOL/L (ref 135–145)
SYSTOLIC BLOOD PRESSURE - MUSE: NORMAL MMHG
T AXIS - MUSE: 76 DEGREES
VENTRICULAR RATE- MUSE: 72 BPM
WBC # BLD AUTO: 6.9 10E3/UL (ref 4–11)

## 2024-01-25 PROCEDURE — 85025 COMPLETE CBC W/AUTO DIFF WBC: CPT | Performed by: NURSE PRACTITIONER

## 2024-01-25 PROCEDURE — 99233 SBSQ HOSP IP/OBS HIGH 50: CPT | Mod: 25 | Performed by: PHYSICIAN ASSISTANT

## 2024-01-25 PROCEDURE — 99239 HOSP IP/OBS DSCHRG MGMT >30: CPT | Performed by: PHYSICIAN ASSISTANT

## 2024-01-25 PROCEDURE — 80048 BASIC METABOLIC PNL TOTAL CA: CPT | Performed by: NURSE PRACTITIONER

## 2024-01-25 PROCEDURE — 99406 BEHAV CHNG SMOKING 3-10 MIN: CPT

## 2024-01-25 PROCEDURE — 36415 COLL VENOUS BLD VENIPUNCTURE: CPT | Performed by: NURSE PRACTITIONER

## 2024-01-25 PROCEDURE — 250N000013 HC RX MED GY IP 250 OP 250 PS 637

## 2024-01-25 PROCEDURE — 97530 THERAPEUTIC ACTIVITIES: CPT | Mod: GO

## 2024-01-25 PROCEDURE — 250N000013 HC RX MED GY IP 250 OP 250 PS 637: Performed by: INTERNAL MEDICINE

## 2024-01-25 PROCEDURE — 97110 THERAPEUTIC EXERCISES: CPT | Mod: GO

## 2024-01-25 RX ORDER — NALOXONE HYDROCHLORIDE 0.4 MG/ML
0.2 INJECTION, SOLUTION INTRAMUSCULAR; INTRAVENOUS; SUBCUTANEOUS
Status: DISCONTINUED | OUTPATIENT
Start: 2024-01-25 | End: 2024-01-25 | Stop reason: HOSPADM

## 2024-01-25 RX ORDER — ROSUVASTATIN CALCIUM 20 MG/1
20 TABLET, COATED ORAL DAILY
Qty: 90 TABLET | Refills: 3 | Status: SHIPPED | OUTPATIENT
Start: 2024-01-25

## 2024-01-25 RX ORDER — NALOXONE HYDROCHLORIDE 0.4 MG/ML
0.4 INJECTION, SOLUTION INTRAMUSCULAR; INTRAVENOUS; SUBCUTANEOUS
Status: DISCONTINUED | OUTPATIENT
Start: 2024-01-25 | End: 2024-01-25 | Stop reason: HOSPADM

## 2024-01-25 RX ORDER — ASPIRIN 81 MG/1
81 TABLET ORAL DAILY
Qty: 30 TABLET | Refills: 3 | Status: SHIPPED | OUTPATIENT
Start: 2024-01-25

## 2024-01-25 RX ORDER — NITROGLYCERIN 0.4 MG/1
TABLET SUBLINGUAL
Qty: 30 TABLET | Refills: 1 | Status: SHIPPED | OUTPATIENT
Start: 2024-01-25

## 2024-01-25 RX ORDER — METOPROLOL TARTRATE 25 MG/1
12.5 TABLET, FILM COATED ORAL 2 TIMES DAILY
Qty: 60 TABLET | Refills: 1 | Status: ON HOLD | OUTPATIENT
Start: 2024-01-25 | End: 2024-03-07

## 2024-01-25 RX ADMIN — ROSUVASTATIN CALCIUM 20 MG: 20 TABLET, FILM COATED ORAL at 08:12

## 2024-01-25 RX ADMIN — TICAGRELOR 90 MG: 90 TABLET ORAL at 06:41

## 2024-01-25 RX ADMIN — ASPIRIN 81 MG: 81 TABLET, COATED ORAL at 08:12

## 2024-01-25 RX ADMIN — SERTRALINE HYDROCHLORIDE 50 MG: 50 TABLET ORAL at 08:12

## 2024-01-25 RX ADMIN — METOPROLOL TARTRATE 25 MG: 25 TABLET, FILM COATED ORAL at 08:12

## 2024-01-25 ASSESSMENT — ACTIVITIES OF DAILY LIVING (ADL)
ADLS_ACUITY_SCORE: 19

## 2024-01-25 NOTE — PLAN OF CARE
A&Ox4. VSS. RA. No CP/SOB reported. Indp to the bathroom. Adequate UO. R groin site CDI and CMS intact. Tele: SR-ST with activity. Wheezy and slight cruddy cough in the AM. Refuse nicotine patch - trying to quit.   Plan: possible discharge

## 2024-01-25 NOTE — PLAN OF CARE
Cardiac Rehab Discharge Summary    Reason for therapy discharge:    All goals and outcomes met, no further needs identified.    Progress towards therapy goal(s). See goals on Care Plan in [facility] electronic health record for goal details.  Goals met    Therapy recommendation(s):    Continued therapy is recommended.  Rationale/Recommendations:  Anticipate home with assist in strenuous IADLs (cooking, cleaning, laundry, shopping, etc) as needed and OP CR for monitored and progressive physical activity and education..

## 2024-01-25 NOTE — DISCHARGE SUMMARY
"[facility]  Hospitalist Discharge Summary      Date of Admission:  1/23/2024  Date of Discharge:  1/25/2024  Discharging Provider: Karolina Bryant PA-C  Discharge Service: Hospitalist Service    Discharge Diagnoses   Anterior STEMI status post DAVIAN x1 to the mid LAD  Suicidal ideation with plan and overdose attempt, resolved  Unspecified depression  Rule out bipolar disorder  Gambling addition  Mild anemia, stable  Asymptomatic hypotension, improved  Tobacco use disorder    Clinically Significant Risk Factors     # Cachexia: Estimated body mass index is 17.93 kg/m  as calculated from the following:    Height as of this encounter: 1.753 m (5' 9\").    Weight as of this encounter: 55.1 kg (121 lb 6.4 oz).       Follow-ups Needed After Discharge   Follow-up Appointments     Follow-up and recommended labs and tests       Follow up with primary care provider, within 7-14 days for hospital   follow- up.  The following labs/tests are recommended: repeat basic labs,   repeat lipid panel in 2 months if not done by cardiology.    Follow up with cardiology clinic, they have scheduled this appointment for   you  Schedule cardiac rehab visits.  Establish with psychiatrist for medication management and therapist            Discharge Disposition   Discharged to home  Condition at discharge: Stable    Hospital Course   Mingo Melgoza is a 48 year old male with a significant past medical history for tobacco abuse, nephrolithiasis, and depression who presented to the emergency department via EMS due to chest pain EKG revealing STEMI.  Patient was found to have anterior MI upon presentation and brought to Cath Lab found to have one-vessel disease to mid LAD with placement of DAVIAN x1.  Admitted by the hospitalist service also found to have active suicidal ideation with attempt of overdose via ingestion of hydrocodone.  Psychiatry consulted and recommended to start sertraline.  They discontinued suicide " precautions they did not feel that the patient was actively suicidal.  He was provided resources for outpatient follow-up with psychiatry, therapy, and gambling addiction.  Full HPI please see admission H&P from Dr. Jeffy Redman dated 1/23/2024.     Anterior STEMI status post DAVIAN x1 to the mid LAD  Emergent coronary angiogram for anterior STEMI on admission revealed single-vessel occlusive CAD with 100% occlusion of mid LAD.  Successful PCI of mid LAD with placement of DAVIAN x 1.  Patient has a strong family history of cardiac disease.  He is a chronic smoker and was encouraged to quit as below.  Lipid panel revealed cholesterol 136 HDL 37, LDL 92.  He was started on Crestor 20 mg daily and lipid panel will be repeated in 2 months. Echocardiogram EF 55-60%, no distinct wlal motion abnormalities seen. Septal motion c/w conduction abnormality. OT evaluated, recommending home with outpatient cardiac rehab was ordered.  Cardiology followed during hospital stay and recommended to continue dual antiplatelet therapy with aspirin and Brilinta to be continued for 1 year uninterrupted.  He was discharged on low-dose beta-blocker metoprolol tartrate 12.5 mg twice daily.  Initially started on low-dose ACE inhibitor lisinopril however this was discontinued due to hypotension.  Pending blood pressure trend, cardiology will consider adding in low-dose ACE in the future.  Cardiac follow-up is been arranged.  Patient is uninsured and financial counseling assisted with providing patient resources.  It was reinforced the necessary use of DAPT uninterrupted and discharge.  Patient will also follow-up with primary care provider.  Discharge she denied chest pain, lightheadedness, dizziness, nausea, vomiting, or palpitations.  No abdominal pain.  He feels well and would like to go home     Suicidal ideation with plan and overdose attempt, resolved  Unspecified depression  Rule out bipolar disorder  Gambling addition  Patient reports  episodes of situational as well as seasonal depression. 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 Uninsured. Patient discussing future plans and reports strong support with his sister.  Psychiatry consulted and recommended to start sertraline milligrams daily.  They discontinued suicide precautions I do not feel that the patient was actively suicidal.  He was provided resources for outpatient follow-up with psychiatry, therapy, and gambling addiction. Establish with psychiatrist for medication management and therapist for CBT pending insurance     Mild anemia, stable  Likely related to expected minimal loss from femoral approach PCI.  He was slightly hypotensive postprocedure likely due to medications.  There were no signs of active bleeding.  His femoral site was without significant bleeding.  Improved day of discharge.    Recent Labs   Lab 01/25/24  0541 01/24/24  0547 01/23/24  1648   HGB 13.8 12.8* 14.8        Asymptomatic hypotension, improved  Likely secondary from initiation of core measure beta-blockers and ACE inhibitor related to #1.  Prefer the small fluid bolus and adjustments to cardiac medications.      Tobacco abuse with continued use  1 pack/day for about 15 years. He endorses readiness to quit and declines nicotine patch or gum at this time.  Encourage cessation on discharge.      Consultations This Hospital Stay   HOSPITALIST IP CONSULT  NUTRITION SERVICES ADULT IP CONSULT  CARDIAC REHAB IP CONSULT  CARDIOLOGY IP CONSULT  PHARMACY IP CONSULT  PSYCHIATRY IP CONSULT  SMOKING CESSATION PROGRAM IP CONSULT  CARDIOLOGY IP CONSULT  CARE MANAGEMENT / SOCIAL WORK IP CONSULT  SMOKING CESSATION PROGRAM IP CONSULT    Code Status   Full Code    Time Spent on this Encounter   I, Karolina Bryant PA-C, personally saw the patient today and spent greater than 30 minutes discharging this patient.       Karolina Bryant PA-C  [facility] CORONARY CARE UNIT  [address]  [address]  [place] [address]  Phone: [phone]  ______________________________________________________________________    Physical Exam   Vital Signs: Temp: 97.8  F (36.6  C) Temp src: Oral BP: 104/68 Pulse: 64   Resp: 18 SpO2: 97 % O2 Device: None (Room air)    Weight: 121 lbs 6.4 oz    Physical Exam    General: Awake, alert, pleasant thin middle-aged gentleman who appears stated age. Looks comfortable sitting up in bed. No acute distress.  HEENT: Normocephalic, atraumatic. Extraocular movements intact.   Respiratory: Clear to auscultation bilaterally, no rales, wheezing, or rhonchi.  Cardiovascular: Regular rate and rhythm, +S1 and S2, no murmur auscultated. No peripheral edema.   Gastrointestinal: Soft, non-tender, non-distended. Bowel sounds present.  Skin: Warm, dry. No obvious rashes or lesions on exposed skin. Dorsalis pedis pulses palpable bilaterally.  Right femoral site assessed without any recurrent bleeding.  Musculoskeletal: No joint swelling, erythema or tenderness. Moves all extremities equally.  Neurologic: AAO x3. Cranial nerves 2-12 grossly intact, normal strength and sensation.  Psychiatric: Appropriate mood and affect. No obvious anxiety or depression.         Primary Care Physician   Physician No Ref-Primary    Discharge Orders      CARDIAC REHAB REFERRAL      Follow-Up with Cardiology [facility]      Adult Mental Health [facility] Referral      Reason for your hospital stay    You were admitted with a heart attack. You had a stent placed. You also saw psychiatry and were started on a new antidepressant.     Activity    Your activity upon discharge: activity as tolerated     Discharge Instructions    1. Continue dual antiplatelet therapy  with aspirin and Brilinta to be continued for on year. Discussed the importance of continuing this uninterrupted.   2. High intensity statin therapy. Started on Crestor 20 mg daily. Lipid panel shows total cholesterol 136, LDL 92. Follow up outpatient lipid  panel in 2 months.  3. Continue metoprolol tartrate  4. I strongly encourage you to stop smoking.     Follow-up and recommended labs and tests     Follow up with primary care provider, within 7-14 days for hospital follow- up.  The following labs/tests are recommended: repeat basic labs, repeat lipid panel in 2 months if not done by cardiology.    Follow up with cardiology clinic, they have scheduled this appointment for you  Schedule cardiac rehab visits.  Establish with psychiatrist for medication management and therapist     Diet    Follow this diet upon discharge: Orders Placed This Encounter      Low Saturated Fat Na <2400 mg       Significant Results and Procedures   Results for orders placed or performed during the hospital encounter of 24   XR Chest Port 1 View    Narrative    EXAM: XR CHEST PORT 1 VIEW  LOCATION: [facility]  DATE: 2024    INDICATION: Chest pain.  COMPARISON: None available.      Impression    IMPRESSION: Prominent aspect of both lung bases is excluded from field-of-view.    Within this limitation, lungs are clear. No visualized pleural effusion or pneumothorax.    Cardiomediastinal silhouette is normal.    Consider repeat imaging of the chest to include the entirety of the lung bases.   Echocardiogram Complete     Value    LVEF  55-60%    Narrative    [id number]  [id number]  [id number]  [id number]^VLADIMIR^MICHELINE^LB     [facility]  Echocardiography Laboratory  [address] [place] [address]     Name: APPLE ALCALA  MRN: [id number]  : 1975  Study Date: 2024 03:03 PM  Age: 48 yrs  Gender: Male  Patient Location: [facility]  Reason For Study: MI - Acute  Ordering Physician: MICHELINE GILBERT  Performed By: Immanuel Jacobs     BSA: 1.7 m2  Height: 69 in  Weight: 131 lb  HR: 75  BP: 102/68 mmHg  ______________________________________________________________________________  Procedure  Complete Portable Echo Adult.  Optison (NDC #3544-2204) given intravenously.  ______________________________________________________________________________  Interpretation Summary     The visual ejection fraction is 55-60%.  No distinct wall motion abnormalities seen.  Contrast was used without apparent complications.  ______________________________________________________________________________  Left Ventricle  The left ventricle is normal in size. There is normal left ventricular wall  thickness. The visual ejection fraction is 55-60%. Left ventricular diastolic  function is normal. Septal motion is consistent with conduction abnormality.     Right Ventricle  The right ventricle is normal in structure, function and size.     Atria  Normal left atrial size. Right atrial size is normal. Intact atrial septum.     Mitral Valve  The mitral valve is normal in structure and function.     Tricuspid Valve  The tricuspid valve is normal in structure and function. Right ventricle  systolic pressure estimate normal. There is trace tricuspid regurgitation.     Aortic Valve  The aortic valve is normal in structure and function.     Vessels  Normal size aorta. Normal size ascending aorta.     Pericardium  The pericardium appears normal.     Rhythm  Sinus rhythm was noted.     ______________________________________________________________________________  MMode/2D Measurements & Calculations  IVSd: 0.88 cm  LVIDd: 4.4 cm  LVIDs: 3.0 cm  LVPWd: 0.97 cm  FS: 31.4 %     LV mass(C)d: 134.3 grams  LV mass(C)dI: 77.8 grams/m2  Ao root diam: 3.5 cm  LA dimension: 2.9 cm  LA/Ao: 0.83  LVOT diam: 2.0 cm  LVOT area: 3.2 cm2  Ao root diam index Ht(cm/m): 2.0  Ao root diam index BSA (cm/m2): 2.0  LA Volume (BP): 24.7 ml  LA Volume Index (BP): 14.3 ml/m2  RWT: 0.44  TAPSE: 2.9 cm     Doppler Measurements & Calculations  MV E max jigna: 92.5 cm/sec  MV A max jigna: 80.1 cm/sec  MV E/A: 1.2  MV dec slope: 390.1 cm/sec2  MV dec time: 0.24 sec  PA acc time: 0.14 sec  TR max jigna:  196.8 cm/sec  TR max PG: 15.5 mmHg  E/E' av.3  Lateral E/e': 6.6  Medial E/e': 8.0  RV S Hernán: 15.2 cm/sec     ______________________________________________________________________________  Report approved by: Govind Higgins 2024 04:36 PM         Cardiac Catheterization    Narrative    1.  Single-vessel occlusive coronary artery disease, 100% occlusion of mid   LAD  2.  Successful PCI of mid LAD with placement of a 3.0 x 22 mm resolute   [place] stent         Discharge Medications   Current Discharge Medication List        START taking these medications    Details   aspirin 81 MG EC tablet Take 1 tablet (81 mg) by mouth daily Start tomorrow.  Qty: 30 tablet, Refills: 3    Associated Diagnoses: Coronary artery disease involving native coronary artery of native heart without angina pectoris      metoprolol tartrate (LOPRESSOR) 25 MG tablet Take 0.5 tablets (12.5 mg) by mouth 2 times daily  Qty: 60 tablet, Refills: 1    Associated Diagnoses: Coronary artery disease involving native coronary artery of native heart without angina pectoris      nitroGLYcerin (NITROSTAT) 0.4 MG sublingual tablet For chest pain place 1 tablet under the tongue every 5 minutes for 3 doses. If symptoms persist 5 minutes after 1st dose call 911.  Qty: 30 tablet, Refills: 1    Associated Diagnoses: Coronary artery disease involving native coronary artery of native heart without angina pectoris      rosuvastatin (CRESTOR) 20 MG tablet Take 1 tablet (20 mg) by mouth daily  Qty: 90 tablet, Refills: 3    Associated Diagnoses: Coronary artery disease involving native coronary artery of native heart without angina pectoris      sertraline (ZOLOFT) 50 MG tablet Take 1 tablet (50 mg) by mouth daily  Qty: 30 tablet, Refills: 1    Associated Diagnoses: Depression, unspecified depression type      ticagrelor (BRILINTA) 90 MG tablet Take 1 tablet (90 mg) by mouth every 12 hours  Qty: 60 tablet, Refills: 1    Associated Diagnoses: Coronary artery  disease involving native coronary artery of native heart without angina pectoris           Allergies   No Known Allergies

## 2024-01-25 NOTE — PLAN OF CARE
Goal Outcome Evaluation:      Plan of Care Reviewed With: patient        Independent in room. A&O x 4. VSS, O2 stable on RA. Denies pain. R groin site CDI, CMS intact. Tele: SR. Refused nicotine patch. Discharge to home this afternoon.     Pt discharged home in stable condition via sister for transportation. AVS was reviewed and all questions were answered. Pt verbalized understanding of discharge instructions and need for follow up appointments. All personal belongings sent home with patient.

## 2024-01-25 NOTE — PROGRESS NOTES
"   01/25/24 1000   General Information   Patient is receptive to smoking cessation at this time Yes   Packs Per Day 1 PPD   Years Smoked (#) 15 yrs   Cigarette Pack Years 15   Stage of Behavior Change   Patient's Stage of Behavior Change Action \"I am\"   Processes of Change   The following interventions were used (smoking cessation) Identify healthy alternatives;Identify support system;Recognize rewards of value to him/her;Develop strategies to increase confidence to quit;Initiate pro/con list of reasons to quit   Motivation to Quit Scale (1-10) 8   Confidence to Quit Scale (1-10) 9   Quit Attempt Date 01/23/24   Quit Attempt Hours (#) 47 Hours   Final Quit Date 01/23/24   Education/Recommendations   Education Stage-matched literature   Recommendations   (Defer to medical team)   Treatment Time (Minutes) 5 minutes       "

## 2024-01-25 NOTE — PROGRESS NOTES
[facility]  Cardiology Progress Note    Date of Service (when I saw the patient): 01/25/2024  Summary: Mingo Melgoza is a 48 year old male no history of tobacco use but known cardiac problems who was admitted on 1/23/2024 with chest discomfort and an anterior STEMI. The morning of his admission, he took several hydrocodone tabs in an attempted suicidal attempt. Later in the afternoon, he developed acute substernal chest pain and called EMS. EKG showed an anterior STEMI pattern and the cath lab was activated on admission for emergent coronary angiography.   Interval History   We had a long discussion this morning regarding his angiogram, echo results, and CAD. He had some soft BPs after metoprolol and lisinopril were started yesterday. He is feeling well this morning. No chest pain or dyspnea. Denies bleeding from his access site.   Assessment & Plan   Anterior STEMI.  Coronary angiography showed single vessel occlusive disease with a 100% occlusion of the mid LAD treated with a single DAVIAN. Troponin peaked at 2522.  Normal LV function on echocardiogram.   Suicidal ideation. Psychiatry consulted.   Tobacco use. Smoking 1 ppd x 15 years.    Plan:  Continue DAPT with aspirin and Brilinta to be continued for on year. Discussed the importance of continuing this uninterrupted.   High intensity statin therapy. Started on Crestor 20 mg daily. Lipid panel shows total cholesterol 136, LDL 92. Follow up outpatient lipid panel in 2 months.  Continue metoprolol tartrate. Will decrease to 12.5 mg BID.   Please discharge with a prescription for SL nitroglycerin.  Lisinopril stopped due to hypotension. Consider in follow up pending BPs.  Cardiac rehab.  Discussed importance of smoking cessation.   Will arrange for follow up in BV clinic.     Anticipate discharge later today if he does well with cardiac rehab. Please call with questions.     Mely Chacko PA-C    Physical Exam   Temp: 97.8  F (36.6  C)  Temp src: Oral BP: 106/56 Pulse: 77   Resp: 18 SpO2: 97 % O2 Device: None (Room air)    Vitals:    24 1500 24 1655 24 0600   Weight: 59.8 kg (131 lb 13.4 oz) 59.8 kg (131 lb 13.4 oz) 55.1 kg (121 lb 6.4 oz)     Vital Signs with Ranges  Temp:  [97.8  F (36.6  C)-98.5  F (36.9  C)] 97.8  F (36.6  C)  Pulse:  [62-82] 77  Resp:  [14-18] 18  BP: ()/(56-90) 106/56  SpO2:  [92 %-97 %] 97 %   0700 -  0659  In: 880 [P.O.:880]  Out: 1350 [Urine:1350]  Net: -470  Constitutional: NAD.   Musculoskeletal: Moving all extremities  Neurologic: Alert, oriented x 3  Neuropsychiatric: Normal affect   Data   Results for orders placed or performed during the hospital encounter of 24 (from the past 24 hour(s))   Echocardiogram Complete   Result Value Ref Range    LVEF  55-60%     Narrative    [id number]  [id number]  [id number]  [id number]^VLADIMIR^MICHELINE^LB     [facility]  Echocardiography Laboratory  [address] [place], [place] [address]     Name: APPLE ALCALA  MRN: [id number]  : 1975  Study Date: 2024 03:03 PM  Age: 48 yrs  Gender: Male  Patient Location: [facility]  Reason For Study: MI - Acute  Ordering Physician: MICHELINE GILBERT  Performed By: Immanuel Jacobs     BSA: 1.7 m2  Height: 69 in  Weight: 131 lb  HR: 75  BP: 102/68 mmHg  ______________________________________________________________________________  Procedure  Complete Portable Echo Adult. Optison (NDC #[id number]) given intravenously.  ______________________________________________________________________________  Interpretation Summary     The visual ejection fraction is 55-60%.  No distinct wall motion abnormalities seen.  Contrast was used without apparent complications.  ______________________________________________________________________________  Left Ventricle  The left ventricle is normal in size. There is normal left ventricular wall  thickness. The visual ejection fraction is 55-60%. Left  ventricular diastolic  function is normal. Septal motion is consistent with conduction abnormality.     Right Ventricle  The right ventricle is normal in structure, function and size.     Atria  Normal left atrial size. Right atrial size is normal. Intact atrial septum.     Mitral Valve  The mitral valve is normal in structure and function.     Tricuspid Valve  The tricuspid valve is normal in structure and function. Right ventricle  systolic pressure estimate normal. There is trace tricuspid regurgitation.     Aortic Valve  The aortic valve is normal in structure and function.     Vessels  Normal size aorta. Normal size ascending aorta.     Pericardium  The pericardium appears normal.     Rhythm  Sinus rhythm was noted.     ______________________________________________________________________________  MMode/2D Measurements & Calculations  IVSd: 0.88 cm  LVIDd: 4.4 cm  LVIDs: 3.0 cm  LVPWd: 0.97 cm  FS: 31.4 %     LV mass(C)d: 134.3 grams  LV mass(C)dI: 77.8 grams/m2  Ao root diam: 3.5 cm  LA dimension: 2.9 cm  LA/Ao: 0.83  LVOT diam: 2.0 cm  LVOT area: 3.2 cm2  Ao root diam index Ht(cm/m): 2.0  Ao root diam index BSA (cm/m2): 2.0  LA Volume (BP): 24.7 ml  LA Volume Index (BP): 14.3 ml/m2  RWT: 0.44  TAPSE: 2.9 cm     Doppler Measurements & Calculations  MV E max hernán: 92.5 cm/sec  MV A max hernán: 80.1 cm/sec  MV E/A: 1.2  MV dec slope: 390.1 cm/sec2  MV dec time: 0.24 sec  PA acc time: 0.14 sec  TR max hernán: 196.8 cm/sec  TR max PG: 15.5 mmHg  E/E' av.3  Lateral E/e': 6.6  Medial E/e': 8.0  RV S Hernán: 15.2 cm/sec     ______________________________________________________________________________  Report approved by: Govind Higgins 2024 04:36 PM         CBC with platelets differential    Narrative    The following orders were created for panel order CBC with platelets differential.  Procedure                               Abnormality         Status                     ---------                                -----------         ------                     CBC with platelets and d...[[id number]]  Abnormal            Final result                 Please view results for these tests on the individual orders.   Basic metabolic panel   Result Value Ref Range    Sodium 139 135 - 145 mmol/L    Potassium 4.3 3.4 - 5.3 mmol/L    Chloride 102 98 - 107 mmol/L    Carbon Dioxide (CO2) 26 22 - 29 mmol/L    Anion Gap 11 7 - 15 mmol/L    Urea Nitrogen 16.5 6.0 - 20.0 mg/dL    Creatinine 0.87 0.67 - 1.17 mg/dL    GFR Estimate >90 >60 mL/min/1.73m2    Calcium 8.9 8.6 - 10.0 mg/dL    Glucose 64 (L) 70 - 99 mg/dL   CBC with platelets and differential   Result Value Ref Range    WBC Count 6.9 4.0 - 11.0 10e3/uL    RBC Count 4.31 (L) 4.40 - 5.90 10e6/uL    Hemoglobin 13.8 13.3 - 17.7 g/dL    Hematocrit 40.2 40.0 - 53.0 %    MCV 93 78 - 100 fL    MCH 32.0 26.5 - 33.0 pg    MCHC 34.3 31.5 - 36.5 g/dL    RDW 12.3 10.0 - 15.0 %    Platelet Count 197 150 - 450 10e3/uL    % Neutrophils 63 %    % Lymphocytes 24 %    % Monocytes 9 %    % Eosinophils 3 %    % Basophils 1 %    % Immature Granulocytes 0 %    NRBCs per 100 WBC 0 <1 /100    Absolute Neutrophils 4.4 1.6 - 8.3 10e3/uL    Absolute Lymphocytes 1.6 0.8 - 5.3 10e3/uL    Absolute Monocytes 0.6 0.0 - 1.3 10e3/uL    Absolute Eosinophils 0.2 0.0 - 0.7 10e3/uL    Absolute Basophils 0.0 0.0 - 0.2 10e3/uL    Absolute Immature Granulocytes 0.0 <=0.4 10e3/uL    Absolute NRBCs 0.0 10e3/uL   Glucose by meter   Result Value Ref Range    GLUCOSE BY METER POCT 163 (H) 70 - 99 mg/dL        Tele SR     Medications    Percutaneous Coronary Intervention orders placed (this is information for BPA alerting)        aspirin  81 mg Oral Daily    metoprolol tartrate  25 mg Oral BID    nicotine  1 patch Transdermal Daily    nicotine   Transdermal Q8H    rosuvastatin  20 mg Oral Daily    sertraline  50 mg Oral Daily    sodium chloride (PF)  3 mL Intracatheter Q8H    ticagrelor  90 mg Oral Q12H

## 2024-01-26 ENCOUNTER — TELEPHONE (OUTPATIENT)
Dept: CARDIOLOGY | Facility: CLINIC | Age: 49
End: 2024-01-26
Payer: COMMERCIAL

## 2024-01-26 LAB
ATRIAL RATE - MUSE: 77 BPM
DIASTOLIC BLOOD PRESSURE - MUSE: NORMAL MMHG
INTERPRETATION ECG - MUSE: NORMAL
P AXIS - MUSE: 83 DEGREES
PR INTERVAL - MUSE: 138 MS
QRS DURATION - MUSE: 86 MS
QT - MUSE: 368 MS
QTC - MUSE: 416 MS
R AXIS - MUSE: 37 DEGREES
SYSTOLIC BLOOD PRESSURE - MUSE: NORMAL MMHG
T AXIS - MUSE: 82 DEGREES
VENTRICULAR RATE- MUSE: 77 BPM

## 2024-01-26 NOTE — TELEPHONE ENCOUNTER
"Patient was admitted to [facility] on 1/23/24 with acute onset of chest pain. EMS arrived to his home. He was found to have anterior STEMI pattern. Taken emergently for coronary angiography.     PMH: Tobacco use-smoking 1 PPD x 15 years, nephrolithiasis, and depression.     1/24/24: Echo showed EF of 55-60%. No distinct wall motion abnormalities seen. The right ventricle is normal in structure, function and size. No valve issues.    1/23/24: Coronary angiogram via RFA resulted in:    1.  Single-vessel occlusive coronary artery disease, 100% occlusion of mid LAD  2.  Successful PCI of mid LAD with placement of a 3.0 x 22 mm resolute [place] stent    \"Admitted by the hospitalist service also found to have active suicidal ideation with attempt of overdose via ingestion of hydrocodone. Psychiatry consulted and recommended to start sertraline. They discontinued suicide precautions they did not feel that the patient was actively suicidal. He was provided resources for outpatient follow-up with psychiatry, therapy, and gambling addiction.\"    Pt was started on ASA, Metoprolol, NTG, Crestor, Zoloft and Brilinta at time of discharge.    Called patient to discuss any post hospital d/c questions, review medication changes, and confirm f/u appts. Patient denied any questions regarding new medications or changes to PTA medications.     RN confirmed with patient that he was d/c with an adequate supply of the antiplatelet Brilinta, and reminded of importance of taking without interruption.     Pt has an Rx for PRN SL Nitroglycerin.     Patient denied any SOB, chest pain or light headedness.    RFA cardiac cath site is without bleeding, swelling, redness or signs of infection.     RN confirmed with patient that he is scheduled for labs on 2/20/24 at 0830, followed with an OV at 0900 with EVELYN Marga Diaz at our [place] Office. Dr. Harrison's Team RN phone number provided.    Cardiac rehab is scheduled on 2/9/24 at 0745 in " [place].    Patient advised to call clinic with any cardiac related questions or concerns prior to this lukas't. Patient verbalized understanding and agreed with plan. VIDAL Burton RN.

## 2024-03-05 ENCOUNTER — APPOINTMENT (OUTPATIENT)
Dept: GENERAL RADIOLOGY | Facility: CLINIC | Age: 49
End: 2024-03-05
Attending: STUDENT IN AN ORGANIZED HEALTH CARE EDUCATION/TRAINING PROGRAM
Payer: COMMERCIAL

## 2024-03-05 ENCOUNTER — HOSPITAL ENCOUNTER (INPATIENT)
Facility: CLINIC | Age: 49
LOS: 2 days | Discharge: HOME | End: 2024-03-07
Attending: STUDENT IN AN ORGANIZED HEALTH CARE EDUCATION/TRAINING PROGRAM | Admitting: HOSPITALIST
Payer: COMMERCIAL

## 2024-03-05 DIAGNOSIS — R79.89 TROPONIN LEVEL ELEVATED: ICD-10-CM

## 2024-03-05 DIAGNOSIS — R07.9 CHEST PAIN, UNSPECIFIED TYPE: ICD-10-CM

## 2024-03-05 DIAGNOSIS — I21.4 NSTEMI (NON-ST ELEVATED MYOCARDIAL INFARCTION) (H): Primary | ICD-10-CM

## 2024-03-05 LAB
ANION GAP SERPL CALCULATED.3IONS-SCNC: 11 MMOL/L (ref 7–15)
BASOPHILS # BLD AUTO: 0.1 10E3/UL (ref 0–0.2)
BASOPHILS NFR BLD AUTO: 1 %
BUN SERPL-MCNC: 16.3 MG/DL (ref 6–20)
CALCIUM SERPL-MCNC: 9.1 MG/DL (ref 8.6–10)
CHLORIDE SERPL-SCNC: 101 MMOL/L (ref 98–107)
CREAT SERPL-MCNC: 0.82 MG/DL (ref 0.67–1.17)
CRP SERPL-MCNC: <3 MG/L
DEPRECATED HCO3 PLAS-SCNC: 26 MMOL/L (ref 22–29)
EGFRCR SERPLBLD CKD-EPI 2021: >90 ML/MIN/1.73M2
EOSINOPHIL # BLD AUTO: 0.2 10E3/UL (ref 0–0.7)
EOSINOPHIL NFR BLD AUTO: 3 %
ERYTHROCYTE [DISTWIDTH] IN BLOOD BY AUTOMATED COUNT: 12.7 % (ref 10–15)
ERYTHROCYTE [DISTWIDTH] IN BLOOD BY AUTOMATED COUNT: 12.8 % (ref 10–15)
ERYTHROCYTE [SEDIMENTATION RATE] IN BLOOD BY WESTERGREN METHOD: 8 MM/HR (ref 0–15)
GLUCOSE SERPL-MCNC: 123 MG/DL (ref 70–99)
HCT VFR BLD AUTO: 39.6 % (ref 40–53)
HCT VFR BLD AUTO: 42.4 % (ref 40–53)
HGB BLD-MCNC: 13.6 G/DL (ref 13.3–17.7)
HGB BLD-MCNC: 14.5 G/DL (ref 13.3–17.7)
HOLD SPECIMEN: NORMAL
HOLD SPECIMEN: NORMAL
IMM GRANULOCYTES # BLD: 0 10E3/UL
IMM GRANULOCYTES NFR BLD: 0 %
LYMPHOCYTES # BLD AUTO: 1.7 10E3/UL (ref 0.8–5.3)
LYMPHOCYTES NFR BLD AUTO: 26 %
MCH RBC QN AUTO: 31.9 PG (ref 26.5–33)
MCH RBC QN AUTO: 31.9 PG (ref 26.5–33)
MCHC RBC AUTO-ENTMCNC: 34.2 G/DL (ref 31.5–36.5)
MCHC RBC AUTO-ENTMCNC: 34.3 G/DL (ref 31.5–36.5)
MCV RBC AUTO: 93 FL (ref 78–100)
MCV RBC AUTO: 93 FL (ref 78–100)
MONOCYTES # BLD AUTO: 0.5 10E3/UL (ref 0–1.3)
MONOCYTES NFR BLD AUTO: 7 %
NEUTROPHILS # BLD AUTO: 4.1 10E3/UL (ref 1.6–8.3)
NEUTROPHILS NFR BLD AUTO: 63 %
NRBC # BLD AUTO: 0 10E3/UL
NRBC BLD AUTO-RTO: 0 /100
PLATELET # BLD AUTO: 197 10E3/UL (ref 150–450)
PLATELET # BLD AUTO: 208 10E3/UL (ref 150–450)
POTASSIUM SERPL-SCNC: 3.6 MMOL/L (ref 3.4–5.3)
RBC # BLD AUTO: 4.26 10E6/UL (ref 4.4–5.9)
RBC # BLD AUTO: 4.54 10E6/UL (ref 4.4–5.9)
SODIUM SERPL-SCNC: 138 MMOL/L (ref 135–145)
TROPONIN T SERPL HS-MCNC: 10 NG/L
TROPONIN T SERPL HS-MCNC: 105 NG/L
WBC # BLD AUTO: 6.5 10E3/UL (ref 4–11)
WBC # BLD AUTO: 6.6 10E3/UL (ref 4–11)

## 2024-03-05 PROCEDURE — 258N000003 HC RX IP 258 OP 636: Performed by: PHYSICIAN ASSISTANT

## 2024-03-05 PROCEDURE — 36415 COLL VENOUS BLD VENIPUNCTURE: CPT | Performed by: STUDENT IN AN ORGANIZED HEALTH CARE EDUCATION/TRAINING PROGRAM

## 2024-03-05 PROCEDURE — 250N000013 HC RX MED GY IP 250 OP 250 PS 637: Performed by: PHYSICIAN ASSISTANT

## 2024-03-05 PROCEDURE — 83880 ASSAY OF NATRIURETIC PEPTIDE: CPT | Performed by: PHYSICIAN ASSISTANT

## 2024-03-05 PROCEDURE — 99223 1ST HOSP IP/OBS HIGH 75: CPT | Mod: AI | Performed by: PHYSICIAN ASSISTANT

## 2024-03-05 PROCEDURE — 250N000011 HC RX IP 250 OP 636: Performed by: PHYSICIAN ASSISTANT

## 2024-03-05 PROCEDURE — 85027 COMPLETE CBC AUTOMATED: CPT | Performed by: STUDENT IN AN ORGANIZED HEALTH CARE EDUCATION/TRAINING PROGRAM

## 2024-03-05 PROCEDURE — 86140 C-REACTIVE PROTEIN: CPT | Performed by: STUDENT IN AN ORGANIZED HEALTH CARE EDUCATION/TRAINING PROGRAM

## 2024-03-05 PROCEDURE — 250N000011 HC RX IP 250 OP 636: Performed by: STUDENT IN AN ORGANIZED HEALTH CARE EDUCATION/TRAINING PROGRAM

## 2024-03-05 PROCEDURE — 71046 X-RAY EXAM CHEST 2 VIEWS: CPT

## 2024-03-05 PROCEDURE — 120N000001 HC R&B MED SURG/OB

## 2024-03-05 PROCEDURE — 93005 ELECTROCARDIOGRAM TRACING: CPT

## 2024-03-05 PROCEDURE — 250N000013 HC RX MED GY IP 250 OP 250 PS 637: Performed by: STUDENT IN AN ORGANIZED HEALTH CARE EDUCATION/TRAINING PROGRAM

## 2024-03-05 PROCEDURE — 99291 CRITICAL CARE FIRST HOUR: CPT | Mod: 25

## 2024-03-05 PROCEDURE — 85652 RBC SED RATE AUTOMATED: CPT | Performed by: STUDENT IN AN ORGANIZED HEALTH CARE EDUCATION/TRAINING PROGRAM

## 2024-03-05 PROCEDURE — 80048 BASIC METABOLIC PNL TOTAL CA: CPT | Performed by: STUDENT IN AN ORGANIZED HEALTH CARE EDUCATION/TRAINING PROGRAM

## 2024-03-05 PROCEDURE — 85025 COMPLETE CBC W/AUTO DIFF WBC: CPT | Performed by: STUDENT IN AN ORGANIZED HEALTH CARE EDUCATION/TRAINING PROGRAM

## 2024-03-05 PROCEDURE — 84484 ASSAY OF TROPONIN QUANT: CPT | Performed by: STUDENT IN AN ORGANIZED HEALTH CARE EDUCATION/TRAINING PROGRAM

## 2024-03-05 RX ORDER — CALCIUM CARBONATE 500 MG/1
1000 TABLET, CHEWABLE ORAL 4 TIMES DAILY PRN
Status: DISCONTINUED | OUTPATIENT
Start: 2024-03-05 | End: 2024-03-06

## 2024-03-05 RX ORDER — HYDROMORPHONE HCL IN WATER/PF 6 MG/30 ML
0.4 PATIENT CONTROLLED ANALGESIA SYRINGE INTRAVENOUS
Status: DISCONTINUED | OUTPATIENT
Start: 2024-03-05 | End: 2024-03-06

## 2024-03-05 RX ORDER — SODIUM CHLORIDE, SODIUM LACTATE, POTASSIUM CHLORIDE, CALCIUM CHLORIDE 600; 310; 30; 20 MG/100ML; MG/100ML; MG/100ML; MG/100ML
INJECTION, SOLUTION INTRAVENOUS CONTINUOUS
Status: DISCONTINUED | OUTPATIENT
Start: 2024-03-05 | End: 2024-03-06

## 2024-03-05 RX ORDER — ACETAMINOPHEN 650 MG/1
650 SUPPOSITORY RECTAL EVERY 4 HOURS PRN
Status: DISCONTINUED | OUTPATIENT
Start: 2024-03-05 | End: 2024-03-06

## 2024-03-05 RX ORDER — ONDANSETRON 4 MG/1
4 TABLET, ORALLY DISINTEGRATING ORAL EVERY 6 HOURS PRN
Status: DISCONTINUED | OUTPATIENT
Start: 2024-03-05 | End: 2024-03-06

## 2024-03-05 RX ORDER — ASPIRIN 325 MG
325 TABLET ORAL ONCE
Status: COMPLETED | OUTPATIENT
Start: 2024-03-05 | End: 2024-03-05

## 2024-03-05 RX ORDER — NITROGLYCERIN 0.4 MG/1
0.4 TABLET SUBLINGUAL EVERY 5 MIN PRN
Status: DISCONTINUED | OUTPATIENT
Start: 2024-03-05 | End: 2024-03-05

## 2024-03-05 RX ORDER — ROSUVASTATIN CALCIUM 10 MG/1
20 TABLET, COATED ORAL DAILY
Status: DISCONTINUED | OUTPATIENT
Start: 2024-03-06 | End: 2024-03-06

## 2024-03-05 RX ORDER — ONDANSETRON 2 MG/ML
4 INJECTION INTRAMUSCULAR; INTRAVENOUS EVERY 6 HOURS PRN
Status: DISCONTINUED | OUTPATIENT
Start: 2024-03-05 | End: 2024-03-06

## 2024-03-05 RX ORDER — HEPARIN SODIUM 10000 [USP'U]/100ML
0-5000 INJECTION, SOLUTION INTRAVENOUS CONTINUOUS
Status: DISCONTINUED | OUTPATIENT
Start: 2024-03-05 | End: 2024-03-05

## 2024-03-05 RX ORDER — NITROGLYCERIN 0.4 MG/1
0.4 TABLET SUBLINGUAL EVERY 5 MIN PRN
Status: DISCONTINUED | OUTPATIENT
Start: 2024-03-05 | End: 2024-03-06

## 2024-03-05 RX ORDER — TRAZODONE HYDROCHLORIDE 50 MG/1
50 TABLET, FILM COATED ORAL
Status: DISCONTINUED | OUTPATIENT
Start: 2024-03-05 | End: 2024-03-06

## 2024-03-05 RX ORDER — OXYCODONE HYDROCHLORIDE 5 MG/1
5 TABLET ORAL EVERY 4 HOURS PRN
Status: DISCONTINUED | OUTPATIENT
Start: 2024-03-05 | End: 2024-03-06

## 2024-03-05 RX ORDER — HYDROMORPHONE HCL IN WATER/PF 6 MG/30 ML
0.2 PATIENT CONTROLLED ANALGESIA SYRINGE INTRAVENOUS
Status: DISCONTINUED | OUTPATIENT
Start: 2024-03-05 | End: 2024-03-06

## 2024-03-05 RX ORDER — ACETAMINOPHEN 325 MG/1
650 TABLET ORAL EVERY 4 HOURS PRN
Status: DISCONTINUED | OUTPATIENT
Start: 2024-03-05 | End: 2024-03-06

## 2024-03-05 RX ORDER — LIDOCAINE 40 MG/G
CREAM TOPICAL
Status: DISCONTINUED | OUTPATIENT
Start: 2024-03-05 | End: 2024-03-06

## 2024-03-05 RX ORDER — NICOTINE 21 MG/24HR
1 PATCH, TRANSDERMAL 24 HOURS TRANSDERMAL DAILY PRN
Status: DISCONTINUED | OUTPATIENT
Start: 2024-03-05 | End: 2024-03-06

## 2024-03-05 RX ORDER — ASPIRIN 81 MG/1
81 TABLET ORAL DAILY
Status: DISCONTINUED | OUTPATIENT
Start: 2024-03-06 | End: 2024-03-06

## 2024-03-05 RX ORDER — HEPARIN SODIUM 10000 [USP'U]/100ML
0-5000 INJECTION, SOLUTION INTRAVENOUS CONTINUOUS
Status: DISCONTINUED | OUTPATIENT
Start: 2024-03-05 | End: 2024-03-06

## 2024-03-05 RX ADMIN — HEPARIN SODIUM 700 UNITS/HR: 10000 INJECTION, SOLUTION INTRAVENOUS at 23:47

## 2024-03-05 RX ADMIN — HEPARIN SODIUM 700 UNITS/HR: 10000 INJECTION, SOLUTION INTRAVENOUS at 22:40

## 2024-03-05 RX ADMIN — Medication 5 MG: at 23:28

## 2024-03-05 RX ADMIN — TICAGRELOR 90 MG: 90 TABLET ORAL at 23:28

## 2024-03-05 RX ADMIN — SODIUM CHLORIDE, POTASSIUM CHLORIDE, SODIUM LACTATE AND CALCIUM CHLORIDE: 600; 310; 30; 20 INJECTION, SOLUTION INTRAVENOUS at 23:46

## 2024-03-05 RX ADMIN — ASPIRIN 325 MG ORAL TABLET 325 MG: 325 PILL ORAL at 18:44

## 2024-03-05 RX ADMIN — NITROGLYCERIN 0.4 MG: 0.4 TABLET SUBLINGUAL at 18:44

## 2024-03-05 ASSESSMENT — ACTIVITIES OF DAILY LIVING (ADL)
ADLS_ACUITY_SCORE: 35

## 2024-03-05 ASSESSMENT — COLUMBIA-SUICIDE SEVERITY RATING SCALE - C-SSRS
1. IN THE PAST MONTH, HAVE YOU WISHED YOU WERE DEAD OR WISHED YOU COULD GO TO SLEEP AND NOT WAKE UP?: NO
2. HAVE YOU ACTUALLY HAD ANY THOUGHTS OF KILLING YOURSELF IN THE PAST MONTH?: NO
6. HAVE YOU EVER DONE ANYTHING, STARTED TO DO ANYTHING, OR PREPARED TO DO ANYTHING TO END YOUR LIFE?: NO
6. HAVE YOU EVER DONE ANYTHING, STARTED TO DO ANYTHING, OR PREPARED TO DO ANYTHING TO END YOUR LIFE?: YES

## 2024-03-06 ENCOUNTER — APPOINTMENT (OUTPATIENT)
Dept: CARDIOLOGY | Facility: CLINIC | Age: 49
End: 2024-03-06
Attending: PHYSICIAN ASSISTANT
Payer: COMMERCIAL

## 2024-03-06 LAB
ANION GAP SERPL CALCULATED.3IONS-SCNC: 8 MMOL/L (ref 7–15)
ATRIAL RATE - MUSE: 78 BPM
BUN SERPL-MCNC: 13.7 MG/DL (ref 6–20)
CALCIUM SERPL-MCNC: 8.8 MG/DL (ref 8.6–10)
CHLORIDE SERPL-SCNC: 105 MMOL/L (ref 98–107)
CHOLEST SERPL-MCNC: 148 MG/DL
CREAT SERPL-MCNC: 0.74 MG/DL (ref 0.67–1.17)
DEPRECATED HCO3 PLAS-SCNC: 26 MMOL/L (ref 22–29)
DIASTOLIC BLOOD PRESSURE - MUSE: NORMAL MMHG
EGFRCR SERPLBLD CKD-EPI 2021: >90 ML/MIN/1.73M2
ERYTHROCYTE [DISTWIDTH] IN BLOOD BY AUTOMATED COUNT: 12.7 % (ref 10–15)
GLUCOSE BLDC GLUCOMTR-MCNC: 81 MG/DL (ref 70–99)
GLUCOSE SERPL-MCNC: 81 MG/DL (ref 70–99)
HCT VFR BLD AUTO: 40.2 % (ref 40–53)
HDLC SERPL-MCNC: 37 MG/DL
HGB BLD-MCNC: 13.6 G/DL (ref 13.3–17.7)
INTERPRETATION ECG - MUSE: NORMAL
LDLC SERPL CALC-MCNC: 99 MG/DL
MCH RBC QN AUTO: 31.4 PG (ref 26.5–33)
MCHC RBC AUTO-ENTMCNC: 33.8 G/DL (ref 31.5–36.5)
MCV RBC AUTO: 93 FL (ref 78–100)
NONHDLC SERPL-MCNC: 111 MG/DL
NT-PROBNP SERPL-MCNC: 37 PG/ML (ref 0–450)
P AXIS - MUSE: 80 DEGREES
PLATELET # BLD AUTO: 178 10E3/UL (ref 150–450)
POTASSIUM SERPL-SCNC: 3.8 MMOL/L (ref 3.4–5.3)
PR INTERVAL - MUSE: 148 MS
QRS DURATION - MUSE: 88 MS
QT - MUSE: 368 MS
QTC - MUSE: 419 MS
R AXIS - MUSE: 75 DEGREES
RBC # BLD AUTO: 4.33 10E6/UL (ref 4.4–5.9)
SODIUM SERPL-SCNC: 139 MMOL/L (ref 135–145)
SYSTOLIC BLOOD PRESSURE - MUSE: NORMAL MMHG
T AXIS - MUSE: 77 DEGREES
TRIGL SERPL-MCNC: 61 MG/DL
TROPONIN T SERPL HS-MCNC: 352 NG/L
TROPONIN T SERPL HS-MCNC: 403 NG/L
UFH PPP CHRO-ACNC: 0.2 IU/ML
VENTRICULAR RATE- MUSE: 78 BPM
WBC # BLD AUTO: 4.6 10E3/UL (ref 4–11)

## 2024-03-06 PROCEDURE — B2111ZZ FLUOROSCOPY OF MULTIPLE CORONARY ARTERIES USING LOW OSMOLAR CONTRAST: ICD-10-PCS | Performed by: INTERNAL MEDICINE

## 2024-03-06 PROCEDURE — C1769 GUIDE WIRE: HCPCS | Performed by: INTERNAL MEDICINE

## 2024-03-06 PROCEDURE — 258N000003 HC RX IP 258 OP 636: Performed by: INTERNAL MEDICINE

## 2024-03-06 PROCEDURE — 99232 SBSQ HOSP IP/OBS MODERATE 35: CPT | Performed by: INTERNAL MEDICINE

## 2024-03-06 PROCEDURE — 120N000001 HC R&B MED SURG/OB

## 2024-03-06 PROCEDURE — 999N000208 ECHOCARDIOGRAM COMPLETE

## 2024-03-06 PROCEDURE — C1894 INTRO/SHEATH, NON-LASER: HCPCS | Performed by: INTERNAL MEDICINE

## 2024-03-06 PROCEDURE — 250N000013 HC RX MED GY IP 250 OP 250 PS 637: Performed by: PHYSICIAN ASSISTANT

## 2024-03-06 PROCEDURE — 80048 BASIC METABOLIC PNL TOTAL CA: CPT | Performed by: PHYSICIAN ASSISTANT

## 2024-03-06 PROCEDURE — 85520 HEPARIN ASSAY: CPT | Performed by: HOSPITALIST

## 2024-03-06 PROCEDURE — 85027 COMPLETE CBC AUTOMATED: CPT | Performed by: PHYSICIAN ASSISTANT

## 2024-03-06 PROCEDURE — 255N000002 HC RX 255 OP 636: Performed by: HOSPITALIST

## 2024-03-06 PROCEDURE — 250N000011 HC RX IP 250 OP 636: Performed by: INTERNAL MEDICINE

## 2024-03-06 PROCEDURE — 93306 TTE W/DOPPLER COMPLETE: CPT | Mod: 26 | Performed by: INTERNAL MEDICINE

## 2024-03-06 PROCEDURE — 82465 ASSAY BLD/SERUM CHOLESTEROL: CPT | Performed by: INTERNAL MEDICINE

## 2024-03-06 PROCEDURE — 93454 CORONARY ARTERY ANGIO S&I: CPT | Mod: 26 | Performed by: INTERNAL MEDICINE

## 2024-03-06 PROCEDURE — 99152 MOD SED SAME PHYS/QHP 5/>YRS: CPT | Performed by: INTERNAL MEDICINE

## 2024-03-06 PROCEDURE — 93454 CORONARY ARTERY ANGIO S&I: CPT | Performed by: INTERNAL MEDICINE

## 2024-03-06 PROCEDURE — 250N000013 HC RX MED GY IP 250 OP 250 PS 637: Performed by: INTERNAL MEDICINE

## 2024-03-06 PROCEDURE — 84484 ASSAY OF TROPONIN QUANT: CPT | Performed by: PHYSICIAN ASSISTANT

## 2024-03-06 PROCEDURE — 99223 1ST HOSP IP/OBS HIGH 75: CPT | Mod: 25 | Performed by: INTERNAL MEDICINE

## 2024-03-06 PROCEDURE — 272N000001 HC OR GENERAL SUPPLY STERILE: Performed by: INTERNAL MEDICINE

## 2024-03-06 PROCEDURE — 93010 ELECTROCARDIOGRAM REPORT: CPT | Performed by: INTERNAL MEDICINE

## 2024-03-06 PROCEDURE — 250N000009 HC RX 250: Performed by: INTERNAL MEDICINE

## 2024-03-06 PROCEDURE — C1887 CATHETER, GUIDING: HCPCS | Performed by: INTERNAL MEDICINE

## 2024-03-06 PROCEDURE — 36415 COLL VENOUS BLD VENIPUNCTURE: CPT | Performed by: PHYSICIAN ASSISTANT

## 2024-03-06 RX ORDER — NALOXONE HYDROCHLORIDE 0.4 MG/ML
0.2 INJECTION, SOLUTION INTRAMUSCULAR; INTRAVENOUS; SUBCUTANEOUS
Status: DISCONTINUED | OUTPATIENT
Start: 2024-03-06 | End: 2024-03-06

## 2024-03-06 RX ORDER — ONDANSETRON 2 MG/ML
4 INJECTION INTRAMUSCULAR; INTRAVENOUS EVERY 6 HOURS PRN
Status: DISCONTINUED | OUTPATIENT
Start: 2024-03-06 | End: 2024-03-07 | Stop reason: HOSPADM

## 2024-03-06 RX ORDER — ASPIRIN 81 MG/1
81 TABLET ORAL DAILY
Status: DISCONTINUED | OUTPATIENT
Start: 2024-03-07 | End: 2024-03-07 | Stop reason: HOSPADM

## 2024-03-06 RX ORDER — ONDANSETRON 4 MG/1
4 TABLET, ORALLY DISINTEGRATING ORAL EVERY 6 HOURS PRN
Status: DISCONTINUED | OUTPATIENT
Start: 2024-03-06 | End: 2024-03-07 | Stop reason: HOSPADM

## 2024-03-06 RX ORDER — LORAZEPAM 2 MG/ML
0.5 INJECTION INTRAMUSCULAR
Status: DISCONTINUED | OUTPATIENT
Start: 2024-03-06 | End: 2024-03-06 | Stop reason: HOSPADM

## 2024-03-06 RX ORDER — ASPIRIN 325 MG
325 TABLET ORAL ONCE
Status: COMPLETED | OUTPATIENT
Start: 2024-03-06 | End: 2024-03-06

## 2024-03-06 RX ORDER — NALOXONE HYDROCHLORIDE 0.4 MG/ML
0.4 INJECTION, SOLUTION INTRAMUSCULAR; INTRAVENOUS; SUBCUTANEOUS
Status: DISCONTINUED | OUTPATIENT
Start: 2024-03-06 | End: 2024-03-06

## 2024-03-06 RX ORDER — SODIUM CHLORIDE, SODIUM LACTATE, POTASSIUM CHLORIDE, CALCIUM CHLORIDE 600; 310; 30; 20 MG/100ML; MG/100ML; MG/100ML; MG/100ML
INJECTION, SOLUTION INTRAVENOUS CONTINUOUS
Status: DISCONTINUED | OUTPATIENT
Start: 2024-03-06 | End: 2024-03-07

## 2024-03-06 RX ORDER — TRAZODONE HYDROCHLORIDE 50 MG/1
50 TABLET, FILM COATED ORAL
Status: DISCONTINUED | OUTPATIENT
Start: 2024-03-06 | End: 2024-03-07 | Stop reason: HOSPADM

## 2024-03-06 RX ORDER — SODIUM CHLORIDE 9 MG/ML
INJECTION, SOLUTION INTRAVENOUS CONTINUOUS
Status: DISCONTINUED | OUTPATIENT
Start: 2024-03-06 | End: 2024-03-06 | Stop reason: HOSPADM

## 2024-03-06 RX ORDER — FENTANYL CITRATE 50 UG/ML
25 INJECTION, SOLUTION INTRAMUSCULAR; INTRAVENOUS
Status: DISCONTINUED | OUTPATIENT
Start: 2024-03-06 | End: 2024-03-07 | Stop reason: HOSPADM

## 2024-03-06 RX ORDER — LIDOCAINE 40 MG/G
CREAM TOPICAL
Status: DISCONTINUED | OUTPATIENT
Start: 2024-03-06 | End: 2024-03-06 | Stop reason: HOSPADM

## 2024-03-06 RX ORDER — HEPARIN SODIUM 1000 [USP'U]/ML
INJECTION, SOLUTION INTRAVENOUS; SUBCUTANEOUS
Status: DISCONTINUED | OUTPATIENT
Start: 2024-03-06 | End: 2024-03-06 | Stop reason: HOSPADM

## 2024-03-06 RX ORDER — NALOXONE HYDROCHLORIDE 0.4 MG/ML
0.2 INJECTION, SOLUTION INTRAMUSCULAR; INTRAVENOUS; SUBCUTANEOUS
Status: ACTIVE | OUTPATIENT
Start: 2024-03-06 | End: 2024-03-06

## 2024-03-06 RX ORDER — FENTANYL CITRATE 50 UG/ML
INJECTION, SOLUTION INTRAMUSCULAR; INTRAVENOUS
Status: DISCONTINUED | OUTPATIENT
Start: 2024-03-06 | End: 2024-03-06 | Stop reason: HOSPADM

## 2024-03-06 RX ORDER — ATROPINE SULFATE 0.1 MG/ML
0.5 INJECTION INTRAVENOUS
Status: ACTIVE | OUTPATIENT
Start: 2024-03-06 | End: 2024-03-06

## 2024-03-06 RX ORDER — ROSUVASTATIN CALCIUM 10 MG/1
20 TABLET, COATED ORAL DAILY
Status: DISCONTINUED | OUTPATIENT
Start: 2024-03-07 | End: 2024-03-07

## 2024-03-06 RX ORDER — OXYCODONE HYDROCHLORIDE 5 MG/1
5 TABLET ORAL EVERY 4 HOURS PRN
Status: DISCONTINUED | OUTPATIENT
Start: 2024-03-06 | End: 2024-03-07 | Stop reason: HOSPADM

## 2024-03-06 RX ORDER — NITROGLYCERIN 0.4 MG/1
0.4 TABLET SUBLINGUAL EVERY 5 MIN PRN
Status: DISCONTINUED | OUTPATIENT
Start: 2024-03-07 | End: 2024-03-07 | Stop reason: HOSPADM

## 2024-03-06 RX ORDER — LORAZEPAM 0.5 MG/1
0.5 TABLET ORAL
Status: DISCONTINUED | OUTPATIENT
Start: 2024-03-06 | End: 2024-03-06 | Stop reason: HOSPADM

## 2024-03-06 RX ORDER — NITROGLYCERIN 5 MG/ML
VIAL (ML) INTRAVENOUS
Status: DISCONTINUED | OUTPATIENT
Start: 2024-03-06 | End: 2024-03-06 | Stop reason: HOSPADM

## 2024-03-06 RX ORDER — ACETAMINOPHEN 325 MG/1
650 TABLET ORAL EVERY 4 HOURS PRN
Status: DISCONTINUED | OUTPATIENT
Start: 2024-03-06 | End: 2024-03-07 | Stop reason: HOSPADM

## 2024-03-06 RX ORDER — VERAPAMIL HYDROCHLORIDE 2.5 MG/ML
INJECTION, SOLUTION INTRAVENOUS
Status: DISCONTINUED | OUTPATIENT
Start: 2024-03-06 | End: 2024-03-06 | Stop reason: HOSPADM

## 2024-03-06 RX ORDER — CALCIUM CARBONATE 500 MG/1
1000 TABLET, CHEWABLE ORAL 4 TIMES DAILY PRN
Status: DISCONTINUED | OUTPATIENT
Start: 2024-03-06 | End: 2024-03-07 | Stop reason: HOSPADM

## 2024-03-06 RX ORDER — NALOXONE HYDROCHLORIDE 0.4 MG/ML
0.4 INJECTION, SOLUTION INTRAMUSCULAR; INTRAVENOUS; SUBCUTANEOUS
Status: ACTIVE | OUTPATIENT
Start: 2024-03-06 | End: 2024-03-06

## 2024-03-06 RX ORDER — HYDROMORPHONE HCL IN WATER/PF 6 MG/30 ML
0.2 PATIENT CONTROLLED ANALGESIA SYRINGE INTRAVENOUS
Status: DISCONTINUED | OUTPATIENT
Start: 2024-03-06 | End: 2024-03-07 | Stop reason: HOSPADM

## 2024-03-06 RX ORDER — FLUMAZENIL 0.1 MG/ML
0.2 INJECTION, SOLUTION INTRAVENOUS
Status: ACTIVE | OUTPATIENT
Start: 2024-03-06 | End: 2024-03-06

## 2024-03-06 RX ORDER — ASPIRIN 81 MG/1
243 TABLET, CHEWABLE ORAL ONCE
Status: COMPLETED | OUTPATIENT
Start: 2024-03-06 | End: 2024-03-06

## 2024-03-06 RX ORDER — POTASSIUM CHLORIDE 1500 MG/1
20 TABLET, EXTENDED RELEASE ORAL
Status: COMPLETED | OUTPATIENT
Start: 2024-03-06 | End: 2024-03-06

## 2024-03-06 RX ORDER — OXYCODONE HYDROCHLORIDE 5 MG/1
10 TABLET ORAL EVERY 4 HOURS PRN
Status: DISCONTINUED | OUTPATIENT
Start: 2024-03-06 | End: 2024-03-07 | Stop reason: HOSPADM

## 2024-03-06 RX ORDER — IOPAMIDOL 755 MG/ML
INJECTION, SOLUTION INTRAVASCULAR
Status: DISCONTINUED | OUTPATIENT
Start: 2024-03-06 | End: 2024-03-06 | Stop reason: HOSPADM

## 2024-03-06 RX ORDER — HYDROMORPHONE HCL IN WATER/PF 6 MG/30 ML
0.4 PATIENT CONTROLLED ANALGESIA SYRINGE INTRAVENOUS
Status: DISCONTINUED | OUTPATIENT
Start: 2024-03-06 | End: 2024-03-07 | Stop reason: HOSPADM

## 2024-03-06 RX ORDER — SODIUM CHLORIDE 9 MG/ML
75 INJECTION, SOLUTION INTRAVENOUS CONTINUOUS
Status: ACTIVE | OUTPATIENT
Start: 2024-03-06 | End: 2024-03-06

## 2024-03-06 RX ADMIN — TICAGRELOR 90 MG: 90 TABLET ORAL at 08:45

## 2024-03-06 RX ADMIN — SODIUM CHLORIDE 75 ML/HR: 9 INJECTION, SOLUTION INTRAVENOUS at 13:58

## 2024-03-06 RX ADMIN — SERTRALINE HYDROCHLORIDE 50 MG: 50 TABLET ORAL at 11:19

## 2024-03-06 RX ADMIN — HUMAN ALBUMIN MICROSPHERES AND PERFLUTREN 4 ML: 10; .22 INJECTION, SOLUTION INTRAVENOUS at 11:03

## 2024-03-06 RX ADMIN — Medication 5 MG: at 21:39

## 2024-03-06 RX ADMIN — METOPROLOL TARTRATE 12.5 MG: 25 TABLET, FILM COATED ORAL at 08:45

## 2024-03-06 RX ADMIN — ASPIRIN 81 MG CHEWABLE TABLET 243 MG: 81 TABLET CHEWABLE at 11:24

## 2024-03-06 RX ADMIN — ROSUVASTATIN 20 MG: 10 TABLET, FILM COATED ORAL at 11:20

## 2024-03-06 RX ADMIN — METOPROLOL TARTRATE 12.5 MG: 25 TABLET, FILM COATED ORAL at 21:39

## 2024-03-06 RX ADMIN — POTASSIUM CHLORIDE 20 MEQ: 1500 TABLET, EXTENDED RELEASE ORAL at 11:53

## 2024-03-06 RX ADMIN — SODIUM CHLORIDE, POTASSIUM CHLORIDE, SODIUM LACTATE AND CALCIUM CHLORIDE: 600; 310; 30; 20 INJECTION, SOLUTION INTRAVENOUS at 17:50

## 2024-03-06 RX ADMIN — ASPIRIN 81 MG: 81 TABLET, COATED ORAL at 11:19

## 2024-03-06 RX ADMIN — TICAGRELOR 90 MG: 90 TABLET ORAL at 21:39

## 2024-03-06 ASSESSMENT — ACTIVITIES OF DAILY LIVING (ADL)
ADLS_ACUITY_SCORE: 23
ADLS_ACUITY_SCORE: 35
ADLS_ACUITY_SCORE: 23
ADLS_ACUITY_SCORE: 22
ADLS_ACUITY_SCORE: 23
ADLS_ACUITY_SCORE: 38
ADLS_ACUITY_SCORE: 23
ADLS_ACUITY_SCORE: 23

## 2024-03-06 NOTE — PLAN OF CARE
[facility]    ED Boarding Nurse Handoff Addendum Report:    Date/time: 3/6/2024, 1:30 AM    Activity Level: standby    Fall Risk: Yes:  nonskid shoes/slippers when out of bed, patient and family education, and activity supervised    Active Infusions: Heparin @ 700 u/hr and LR @ 75 ml/hr    Current Meds Due: see MAR    Current care needs: Heparin infusion, IVF, monitor for signs of chest pain, cardiac monitoring    Oxygen requirements (liters/min and/or FiO2): n/a    Respiratory status: Room air    Vital signs (within last 30 minutes):    Vitals:    03/05/24 2330 03/05/24 2345 03/06/24 0000 03/06/24 0146   BP:  110/71  106/77   Pulse: 57 59 60 58   Resp: 13 10 18 12   Temp:  98.5  F (36.9  C)     TempSrc:  Temporal     SpO2:  97%  96%   Weight:       Height:           Focused assessment within last 30 minutes:    A/O x4. VSS on RA, soft BP's and bradycardic at times. Denies pain nausea, SOB, and CP. C/o generalized weakness and fatigue. SBA. Able to make needs known. Call light within reach.     ED Boarding Nurse name: Hien Lin RN
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
## 2024-03-06 NOTE — PROCEDURES
[facility]    Procedure: Coronary angiogram    Date/Time: 3/6/2024 1:32 PM    Performed by: Wade Rubalcava MD  Authorized by: Wade Rubalcava MD      UNIVERSAL PROTOCOL   Site Marked: Yes  Prior Images Obtained and Reviewed:  Yes  Required items: Required blood products, implants, devices and special equipment available    Patient identity confirmed:  Verbally with patient  Patient was reevaluated immediately before administering moderate or deep sedation or anesthesia  Confirmation Checklist:  Patient's identity using two indicators  Time out: Immediately prior to the procedure a time out was called    Universal Protocol: the Joint Commission Universal Protocol was followed       ANESTHESIA    Local Anesthetic:  Lidocaine 1% without epinephrine      SEDATION  Patient Sedated: Yes    Sedation:  Fentanyl and midazolam  Vital signs: Vital signs monitored during sedation      PROCEDURE  Describe Procedure: Procedure  1) CAG  Approach RTR  Complications none  Indication chest pain sp recent PCI LAD    Findings  LMCA normal  LAD stent widely patent with TIIMI flow in LAD and all side branches  There is 60% ostial narrowing at origin of D1 and D2 as they exit from stent.  The appearance is unchanged since post intervention films    CX mild atheromatous change no change since last film  RCA Atheromatous changes dominant No change    Assessment : There has been no change in coronary anatomy since the post intervention films 1/2024    Recommendations : continue nilda Rubalcava  Patient Tolerance:  Patient tolerated the procedure well with no immediate complications  Length of time physician/provider present for 1:1 monitoring during sedation: 15

## 2024-03-06 NOTE — ED TRIAGE NOTES
Pt arrives to the ED due to having left sided chest pain that began 1 hr ago while sanding doors. Pt states pain goes down left arm. H/o stent 1.5 months ago. States some SOB and dizziness.

## 2024-03-06 NOTE — ED NOTES
[facility]  ED Nurse Handoff Report    ED Chief complaint: Chest Pain  . ED Diagnosis:   Final diagnoses:   Chest pain, unspecified type   Troponin level elevated       Allergies: No Known Allergies    Code Status: Full Code    Activity level - Baseline/Home:  independent.  Activity Level - Current:   standby.   Lift room needed: No.   Bariatric: No   Needed: No   Isolation: No.   Infection: Not Applicable.     Respiratory status: Room air    Vital Signs (within 30 minutes):   Vitals:    03/05/24 2315 03/05/24 2330 03/05/24 2345 03/06/24 0000   BP:   110/71    Pulse: 61 57 59 60   Resp: 11 13 10 18   Temp:   98.5  F (36.9  C)    TempSrc:   Temporal    SpO2:   97%    Weight:       Height:           Cardiac Rhythm:  ,   Cardiac  Cardiac Rhythm: Normal sinus rhythm  Pain level:    Patient confused: No.   Patient Falls Risk: nonskid shoes/slippers when out of bed.   Elimination Status: Has voided     Patient Report - Initial Complaint: CP.   Focused Assessment: NSTEMI     Abnormal Results:   Labs Ordered and Resulted from Time of ED Arrival to Time of ED Departure   BASIC METABOLIC PANEL - Abnormal       Result Value    Sodium 138      Potassium 3.6      Chloride 101      Carbon Dioxide (CO2) 26      Anion Gap 11      Urea Nitrogen 16.3      Creatinine 0.82      GFR Estimate >90      Calcium 9.1      Glucose 123 (*)    TROPONIN T, HIGH SENSITIVITY - Abnormal    Troponin T, High Sensitivity 105 (*)    CBC WITH PLATELETS - Abnormal    WBC Count 6.5      RBC Count 4.26 (*)     Hemoglobin 13.6      Hematocrit 39.6 (*)     MCV 93      MCH 31.9      MCHC 34.3      RDW 12.8      Platelet Count 197     TROPONIN T, HIGH SENSITIVITY - Normal    Troponin T, High Sensitivity 10     ERYTHROCYTE SEDIMENTATION RATE AUTO - Normal    Erythrocyte Sedimentation Rate 8     CRP INFLAMMATION - Normal    CRP Inflammation <3.00     N TERMINAL PRO BNP OUTPATIENT - Normal    N Terminal Pro BNP Outpatient 37      CBC WITH PLATELETS AND DIFFERENTIAL    WBC Count 6.6      RBC Count 4.54      Hemoglobin 14.5      Hematocrit 42.4      MCV 93      MCH 31.9      MCHC 34.2      RDW 12.7      Platelet Count 208      % Neutrophils 63      % Lymphocytes 26      % Monocytes 7      % Eosinophils 3      % Basophils 1      % Immature Granulocytes 0      NRBCs per 100 WBC 0      Absolute Neutrophils 4.1      Absolute Lymphocytes 1.7      Absolute Monocytes 0.5      Absolute Eosinophils 0.2      Absolute Basophils 0.1      Absolute Immature Granulocytes 0.0      Absolute NRBCs 0.0     TROPONIN T, HIGH SENSITIVITY        Chest XR,  PA & LAT   Final Result   IMPRESSION: Stable size of cardiomediastinal silhouette with coronary stent noted. There is some possible upper lobe predominant oligemia and areas of fibrosis, suggestive of underlying emphysema. No definite airspace consolidation, pleural effusion or    pneumothorax. No acute bony abnormality.      Echocardiogram Complete    (Results Pending)       Treatments provided: see mar  Family Comments: pt to notify family  OBS brochure/video discussed/provided to patient:  N/A  ED Medications:   Medications   lidocaine 1 % 0.1-1 mL (has no administration in time range)   lidocaine (LMX4) cream (has no administration in time range)   sodium chloride (PF) 0.9% PF flush 3 mL (3 mLs Intracatheter $Given 3/5/24 4328)   sodium chloride (PF) 0.9% PF flush 3 mL (has no administration in time range)   calcium carbonate (TUMS) chewable tablet 1,000 mg (has no administration in time range)   Patient is already receiving anticoagulation with heparin, enoxaparin (LOVENOX), warfarin (COUMADIN)  or other anticoagulant medication (has no administration in time range)   acetaminophen (TYLENOL) tablet 650 mg (has no administration in time range)     Or   acetaminophen (TYLENOL) Suppository 650 mg (has no administration in time range)   oxyCODONE IR (ROXICODONE) half-tab 2.5 mg (has no administration in time  range)   oxyCODONE (ROXICODONE) tablet 5 mg (has no administration in time range)   HYDROmorphone (DILAUDID) injection 0.2 mg (has no administration in time range)   HYDROmorphone (DILAUDID) injection 0.4 mg (has no administration in time range)   melatonin tablet 5 mg (5 mg Oral $Given 3/5/24 2328)   ondansetron (ZOFRAN ODT) ODT tab 4 mg (has no administration in time range)     Or   ondansetron (ZOFRAN) injection 4 mg (has no administration in time range)   nicotine (NICODERM CQ) 14 MG/24HR 24 hr patch 1 patch (has no administration in time range)   benzocaine-menthol (CHLORASEPTIC) 6-10 MG lozenge 1 lozenge (has no administration in time range)   nitroGLYcerin (NITROSTAT) sublingual tablet 0.4 mg (has no administration in time range)   heparin 25,000 units in 0.45% NaCl 250 mL ANTICOAGULANT infusion (700 Units/hr Intravenous Rate/Dose Verify 3/6/24 0122)   lactated ringers infusion ( Intravenous $New Bag 3/5/24 2346)   aspirin EC tablet 81 mg (has no administration in time range)   ticagrelor (BRILINTA) tablet 90 mg (90 mg Oral $Given 3/5/24 2328)   metoprolol tartrate (LOPRESSOR) half-tab 12.5 mg (12.5 mg Oral Not Given 3/5/24 2328)   rosuvastatin (CRESTOR) tablet 20 mg (has no administration in time range)   sertraline (ZOLOFT) tablet 50 mg (has no administration in time range)   traZODone (DESYREL) half-tab 50 mg (has no administration in time range)   aspirin (ASA) tablet 325 mg (325 mg Oral $Given 3/5/24 1844)   heparin loading dose for LOW INTENSITY TREATMENT * Give BEFORE starting heparin infusion (3,500 Units Intravenous $Given 3/5/24 2241)       Drips infusing:  Yes  For the majority of the shift this patient was Green.   Interventions performed were see MAR.    Sepsis treatment initiated: No    Cares/treatment/interventions/medications to be completed following ED care: see orders    ED Nurse Name: Edel Dominguez RN  1:25 AM    RECEIVING UNIT ED HANDOFF REVIEW    Above ED Nurse Handoff Report was  reviewed: Yes  Reviewed by: Uma Borrego, RN on March 6, 2024 at 1:33 AM   I Vocera called the ED to inform them the note was read: Yes

## 2024-03-06 NOTE — CONSULTS
[facility]    Cardiology Consultation     Mingo Melgoza MRN#: [id number]   YOB: 1975 Age: 49 year old     Date of Admission:  3/5/2024    Consult Indication:  NSTEMI    Assessment & Plan     # NSTEMI, history of anterior STEMI 1/23/2024 status post DAVIAN to mid LAD, concerning for ISR though has been compliant with medications  # Smoking, smoking cessation encouraged   # Hl, on statin    - Discussed options for further evaluation and management including conservative medical management with close follow up, non-invasive stress testing, or cardiac catheterization/coronary angiography +/- PCI.  Reviewed the risks and benefits of each option at length.   - Recommend cardiac catheterization/coronary angiography +/- PCI.   - Discussed the risks of cardiac catheterization/angiography +/- PCI that include but are not limited to the risk of stroke, heart attack, death, cardiac injury, emergent intervention such as stenting or bypass, contrast induced allergic reaction, renal dysfunction (including risks of temporary or permanent dialysis), and complications related to sedation and respiratory/pulmonary compromise, vascular complications (including bleeding and blood transfusion). Patient denies any major active bleeding issues and is willing to take and comply with dual antiplatelet therapy and understands the associated bleeding risks. Patient understands the overall risks of the procedure and wishes to proceed.  - Continue aspirin, ticagrelor, rosuvastatin, metoprolol  - TTE pending  - Cardiac telemetry.  Monitor electrolytes daily, maintain potassium greater than 4, magnesium greater than 2  - cardiology will follow       Please do not hesitate to page with any questions or concerns.     Srikanth Gracia MD, [facility]  [facility]  Cardiology  March 6, 2024    Voice recognition software utilized.   High complexity     We discussed the importance of smoking cessation. Approximately 5  minutes were devoted to counseling regarding smoking cessation.  This included cessation techniques, resources, strategies and treatment options.     History of Present Illness     Patient is a 49-year-old male with a history of smoking, dyslipidemia, FH of early ASCVD, coronary artery disease with anterior STEMI 1/23/2024 status post DAVIAN to mid LAD who presents with chest pain.    Cardiac history notable for anterior STEMI 1/23/2024.  Coronary angiogram 1/23/2024 demonstrated a 100% mid LAD culprit lesion for which he underwent PCI with DAVIAN, otherwise was noted to have a 20% mid left circumflex lesion, proximal to mid RCA 25% stenosis.  TTE 1/20/2024 normal LV function LVEF 55 to 60%, normal RV function, no significant valvular abnormalities.    Presented again with chest pain yesterday, similar to his prior anginal symptoms leading up to the STEMI.  He has been taking his medications as directed.  He does still smoke about 4-5 cigarettes a day.  ECG personally reviewed demonstrating normal sinus rhythm at 78 bpm, anteroseptal infarct, mild less than 1 mm ST segment elevation in lead V2, QTc 419 ms.  Labs notable for initial troponin normal, which is since increased to 403.  CRP normal.  Electrolytes and renal function normal, CBC normal.  Was administered nitroglycerin which resulted in improvement in chest pain.  Patient was admitted to the Internal Medicine service.  Started on treatment for ACS including heparin GTT.  Currently asymptomatic. TTE pending.         Past Medical History   Past Medical History:   Diagnosis Date    Major depression     Nephrolithiasis        Past Surgical History   Past Surgical History:   Procedure Laterality Date    CV CORONARY ANGIOGRAM N/A 1/23/2024    Procedure: Coronary Angiogram;  Surgeon: Scott Harrison MD;  Location:  HEART CARDIAC CATH LAB    CV PCI N/A 1/23/2024    Procedure: Percutaneous Coronary Intervention;  Surgeon: Scott Harrison MD;  Location:   HEART CARDIAC CATH LAB       Prior to Admission Medications   Prior to Admission Medications   Prescriptions Last Dose Informant Patient Reported? Taking?   aspirin 81 MG EC tablet   No No   Sig: Take 1 tablet (81 mg) by mouth daily Start tomorrow.   metoprolol tartrate (LOPRESSOR) 25 MG tablet   No No   Sig: Take 0.5 tablets (12.5 mg) by mouth 2 times daily   nitroGLYcerin (NITROSTAT) 0.4 MG sublingual tablet   No No   Sig: For chest pain place 1 tablet under the tongue every 5 minutes for 3 doses. If symptoms persist 5 minutes after 1st dose call 911.   rosuvastatin (CRESTOR) 20 MG tablet   No No   Sig: Take 1 tablet (20 mg) by mouth daily   sertraline (ZOLOFT) 50 MG tablet   No No   Sig: Take 1 tablet (50 mg) by mouth daily   ticagrelor (BRILINTA) 90 MG tablet   No No   Sig: Take 1 tablet (90 mg) by mouth every 12 hours      Facility-Administered Medications: None     Current Facility-Administered Medications   Medication Dose Route Frequency    aspirin  81 mg Oral Daily    melatonin  5 mg Oral At Bedtime    metoprolol tartrate  12.5 mg Oral BID    rosuvastatin  20 mg Oral Daily    sertraline  50 mg Oral Daily    sodium chloride (PF)  3 mL Intracatheter Q8H    ticagrelor  90 mg Oral BID     Current Facility-Administered Medications   Medication Last Rate    heparin 700 Units/hr (03/06/24 0304)    lactated ringers 75 mL/hr at 03/06/24 0304    - MEDICATION INSTRUCTIONS -       Allergies   No Known Allergies    Social History    reports that he has been smoking cigarettes. He has a 15 pack-year smoking history. He has never used smokeless tobacco. He reports that he does not drink alcohol and does not use drugs.    Family History   I have reviewed this patient's family history and updated it with pertinent information if needed.  Family History   Problem Relation Age of Onset    Coronary Artery Disease Mother 45    Breast Cancer Mother     Hypertension Mother     Coronary Artery Disease Father 55    Hypertension  Father     Diabetes Type 2  Father           Review of Systems   A comprehensive review of system was performed and is negative other than that noted in the HPI or here.     Physical Exam   Vital Signs with Ranges  Temp:  [97.5  F (36.4  C)-98.8  F (37.1  C)] 97.7  F (36.5  C)  Pulse:  [56-72] 65  Resp:  [9-24] 18  BP: (100-161)/() 109/58  SpO2:  [92 %-98 %] 96 %  Wt Readings from Last 4 Encounters:   24 57.8 kg (127 lb 6.4 oz)   24 55.1 kg (121 lb 6.4 oz)     I/O last 3 completed shifts:  In: 18.9 [I.V.:18.9]  Out: -     Vital signs were personally reviewed:  Temperatures:  Current - Temp: 97.7  F (36.5  C); Max - Temp  Av.1  F (36.7  C)  Min: 97.5  F (36.4  C)  Max: 98.8  F (37.1  C)  Respiration range: Resp  Av.4  Min: 9  Max: 24  Pulse range: Pulse  Av.5  Min: 56  Max: 72  Blood pressure range: Systolic (24hrs), Av , Min:100 , Max:161   ; Diastolic (24hrs), Av, Min:58, Max:110    Pulse oximetry range: SpO2  Av.2 %  Min: 92 %  Max: 98 %    Intake/Output Summary (Last 24 hours) at 3/6/2024 0749  Last data filed at 3/6/2024 0122  Gross per 24 hour   Intake 18.9 ml   Output --   Net 18.9 ml     127 lbs 6.4 oz  Body mass index is 18.81 kg/m .   Body surface area is 1.68 meters squared.    Physical Exam:   General/Constitutional: appears stated age, in no apparent distress, appears to be well nourished  Respiratory: clear to auscultation bilaterally, no wheezes, no rales, no increased work of breathing  Cardiovascular: JVP normal, regular rate, regular rhythm, normal S1 and S2, no S3, S4, no murmur appreciated, no lower extremity edema    Laboratory tests personally reviewed:   CMP  Recent Labs   Lab 24  1809      POTASSIUM 3.6   CHLORIDE 101   CO2 26   ANIONGAP 11   *   BUN 16.3   CR 0.82   GFRESTIMATED >90   LEE 9.1     CBC  Recent Labs   Lab 24  0709 24  2250 24  1809   WBC 4.6 6.5 6.6   RBC 4.33* 4.26* 4.54   HGB 13.6 13.6 14.5  "  HCT 40.2 39.6* 42.4   MCV 93 93 93   MCH 31.4 31.9 31.9   MCHC 33.8 34.3 34.2   RDW 12.7 12.8 12.7    197 208     INRNo lab results found in last 7 days.  No results found for: \"TROPI\", \"TROPONIN\", \"TROPR\", \"TROPN\"  Recent Labs   Lab Test 24  2105   CHOL 136   HDL 37*   LDL 92   TRIG 34     No results found for: \"A1C\"  No results found for: \"TSH\"    Imaging:  Recent Results (from the past 4320 hour(s))   Echocardiogram Complete   Result Value    LVEF  55-60%    Narrative    [id number]  [id number]  [id number]^VLADIMIR^MICHELINE^LB     [facility]  Echocardiography Laboratory  [address] [place], [place] [address]     Name: APPLE ALCALA  MRN: [id number]  : 1975  Study Date: 2024 03:03 PM  Age: 48 yrs  Gender: Male  Patient Location: [facility]  Reason For Study: MI - Acute  Ordering Physician: MICHELINE GILBERT  Performed By: Immanuel Jacobs     BSA: 1.7 m2  Height: 69 in  Weight: 131 lb  HR: 75  BP: 102/68 mmHg  ______________________________________________________________________________  Procedure  Complete Portable Echo Adult. Optison (NDC #0484-8812) given intravenously.  ______________________________________________________________________________  Interpretation Summary     The visual ejection fraction is 55-60%.  No distinct wall motion abnormalities seen.  Contrast was used without apparent complications.  ______________________________________________________________________________  Left Ventricle  The left ventricle is normal in size. There is normal left ventricular wall  thickness. The visual ejection fraction is 55-60%. Left ventricular diastolic  function is normal. Septal motion is consistent with conduction abnormality.     Right Ventricle  The right ventricle is normal in structure, function and size.     Atria  Normal left atrial size. Right atrial size is normal. Intact atrial septum.     Mitral Valve  The mitral valve is normal in " structure and function.     Tricuspid Valve  The tricuspid valve is normal in structure and function. Right ventricle  systolic pressure estimate normal. There is trace tricuspid regurgitation.     Aortic Valve  The aortic valve is normal in structure and function.     Vessels  Normal size aorta. Normal size ascending aorta.     Pericardium  The pericardium appears normal.     Rhythm  Sinus rhythm was noted.     ______________________________________________________________________________  MMode/2D Measurements & Calculations  IVSd: 0.88 cm  LVIDd: 4.4 cm  LVIDs: 3.0 cm  LVPWd: 0.97 cm  FS: 31.4 %     LV mass(C)d: 134.3 grams  LV mass(C)dI: 77.8 grams/m2  Ao root diam: 3.5 cm  LA dimension: 2.9 cm  LA/Ao: 0.83  LVOT diam: 2.0 cm  LVOT area: 3.2 cm2  Ao root diam index Ht(cm/m): 2.0  Ao root diam index BSA (cm/m2): 2.0  LA Volume (BP): 24.7 ml  LA Volume Index (BP): 14.3 ml/m2  RWT: 0.44  TAPSE: 2.9 cm     Doppler Measurements & Calculations  MV E max hernán: 92.5 cm/sec  MV A max hernán: 80.1 cm/sec  MV E/A: 1.2  MV dec slope: 390.1 cm/sec2  MV dec time: 0.24 sec  PA acc time: 0.14 sec  TR max hernán: 196.8 cm/sec  TR max PG: 15.5 mmHg  E/E' av.3  Lateral E/e': 6.6  Medial E/e': 8.0  RV S Hernán: 15.2 cm/sec     ______________________________________________________________________________  Report approved by: Govind Higgins 2024 04:36 PM            Clinically Significant Risk Factors Present on Admission                # Drug Induced Platelet Defect: home medication list includes an antiplatelet medication

## 2024-03-06 NOTE — PHARMACY-ADMISSION MEDICATION HISTORY
Pharmacist Admission Medication History    Admission medication history is complete. The information provided in this note is only as accurate as the sources available at the time of the update.    Information Source(s): Patient, Clinic records, Hospital records, and CareEverywhere/SureScripts via in-person    Pertinent Information:      Changes made to PTA medication list:  Added: None  Deleted: None  Changed: None    Allergies reviewed with patient and updates made in EHR: yes    Medication History Completed By: Rico Warren [facility] 3/6/2024 9:11 AM    PTA Med List   Medication Sig Last Dose    aspirin 81 MG EC tablet Take 1 tablet (81 mg) by mouth daily Start tomorrow. 3/5/2024 at 0700    metoprolol tartrate (LOPRESSOR) 25 MG tablet Take 0.5 tablets (12.5 mg) by mouth 2 times daily 3/5/2024 at 0700    nitroGLYcerin (NITROSTAT) 0.4 MG sublingual tablet For chest pain place 1 tablet under the tongue every 5 minutes for 3 doses. If symptoms persist 5 minutes after 1st dose call 911.     rosuvastatin (CRESTOR) 20 MG tablet Take 1 tablet (20 mg) by mouth daily 3/5/2024 at 0700    sertraline (ZOLOFT) 50 MG tablet Take 1 tablet (50 mg) by mouth daily 3/5/2024 at 0700    ticagrelor (BRILINTA) 90 MG tablet Take 1 tablet (90 mg) by mouth every 12 hours 3/5/2024 at 0700

## 2024-03-06 NOTE — ED PROVIDER NOTES
"  History     Chief Complaint:  Chest Pain       HPI   Mingo Melgoza is a 49 year old male with past medical history CAD s/p DAVIAN LAD 1/23/2024 who presents with central chest pressure that started around 4:30 PM about an hour after an argument with his brother.  He said the argument made him feel he did but did not go further than that.  He was starting to calm down when he had the chest pressure.  Associated paresthesias in the left arm, nausea without vomiting.  In discussion with his family was urged to come to the ER for evaluation.  He took his Brilinta this morning and denies any missed doses of his medications since discharge.  He has been doing otherwise well and has changed his lifestyle significantly since his heart attack.      Has not taken nitro nor had any episodes of chest pain since his stent placement.      Independent Historian:   None - Patient Only    Review of External Notes:   None       Medications:    aspirin 81 MG EC tablet  metoprolol tartrate (LOPRESSOR) 25 MG tablet  nitroGLYcerin (NITROSTAT) 0.4 MG sublingual tablet  rosuvastatin (CRESTOR) 20 MG tablet  sertraline (ZOLOFT) 50 MG tablet  ticagrelor (BRILINTA) 90 MG tablet        Past Medical History:    Past Medical History:   Diagnosis Date    Major depression     Nephrolithiasis        Past Surgical History:    Past Surgical History:   Procedure Laterality Date    CV CORONARY ANGIOGRAM N/A 1/23/2024    Procedure: Coronary Angiogram;  Surgeon: Scott Harrison MD;  Location: [facility] CARDIAC CATH LAB    CV PCI N/A 1/23/2024    Procedure: Percutaneous Coronary Intervention;  Surgeon: Scott Harrison MD;  Location: [facility] CARDIAC CATH LAB        Physical Exam   Patient Vitals for the past 24 hrs:   BP Temp Temp src Pulse Resp SpO2 Height Weight   03/05/24 1850 132/82 -- -- 72 24 95 % -- --   03/05/24 1806 (!) 161/110 98.8  F (37.1  C) Oral 72 22 98 % 1.753 m (5' 9\") 58.1 kg (128 lb 1.4 oz)        Physical Exam  General: " Awake, alert, in no acute distress   HEENT: Atraumatic   EOM normal   External ears normal   Trachea midline  Neck: Supple, normal ROM  CV: Distant heart sounds with intermittent crackles   No lower extremity edema  2+ radial and DP pulses  PULM: Breath sounds normal bilaterally  No wheezes or rales  ABD: Soft, non-tender, non-distended  Normal bowel sounds   No rebound or guarding   MSK: No gross deformities  NEURO: Alert, no focal deficits  Skin: Warm, dry and intact      Emergency Department Course   ECG      ECG results from 03/05/24   EKG 12-lead, tracing only     Value    Systolic Blood Pressure     Diastolic Blood Pressure     Ventricular Rate 78    Atrial Rate 78    ME Interval 148    QRS Duration 88        QTc 419    P Axis 80    R AXIS 75    T Axis 77    Interpretation ECG      Sinus rhythm  Anteroseptal infarct (cited on or before 23-JAN-2024)  Abnormal ECG  When compared with ECG of 24-JAN-2024 07:28,  No significant change was found           Imaging:  Chest XR,  PA & LAT   Final Result   IMPRESSION: Stable size of cardiomediastinal silhouette with coronary stent noted. There is some possible upper lobe predominant oligemia and areas of fibrosis, suggestive of underlying emphysema. No definite airspace consolidation, pleural effusion or    pneumothorax. No acute bony abnormality.      Echocardiogram Complete    (Results Pending)          Laboratory:  Labs Ordered and Resulted from Time of ED Arrival to Time of ED Departure   BASIC METABOLIC PANEL - Abnormal       Result Value    Sodium 138      Potassium 3.6      Chloride 101      Carbon Dioxide (CO2) 26      Anion Gap 11      Urea Nitrogen 16.3      Creatinine 0.82      GFR Estimate >90      Calcium 9.1      Glucose 123 (*)    TROPONIN T, HIGH SENSITIVITY - Abnormal    Troponin T, High Sensitivity 105 (*)    CBC WITH PLATELETS - Abnormal    WBC Count 6.5      RBC Count 4.26 (*)     Hemoglobin 13.6      Hematocrit 39.6 (*)     MCV 93      MCH 31.9       MCHC 34.3      RDW 12.8      Platelet Count 197     TROPONIN T, HIGH SENSITIVITY - Normal    Troponin T, High Sensitivity 10     ERYTHROCYTE SEDIMENTATION RATE AUTO - Normal    Erythrocyte Sedimentation Rate 8     CRP INFLAMMATION - Normal    CRP Inflammation <3.00     CBC WITH PLATELETS AND DIFFERENTIAL    WBC Count 6.6      RBC Count 4.54      Hemoglobin 14.5      Hematocrit 42.4      MCV 93      MCH 31.9      MCHC 34.2      RDW 12.7      Platelet Count 208      % Neutrophils 63      % Lymphocytes 26      % Monocytes 7      % Eosinophils 3      % Basophils 1      % Immature Granulocytes 0      NRBCs per 100 WBC 0      Absolute Neutrophils 4.1      Absolute Lymphocytes 1.7      Absolute Monocytes 0.5      Absolute Eosinophils 0.2      Absolute Basophils 0.1      Absolute Immature Granulocytes 0.0      Absolute NRBCs 0.0     N TERMINAL PRO BNP OUTPATIENT        Procedures   None    Emergency Department Course & Assessments:    Interventions:  Medications   lidocaine 1 % 0.1-1 mL (has no administration in time range)   lidocaine (LMX4) cream (has no administration in time range)   sodium chloride (PF) 0.9% PF flush 3 mL (3 mLs Intracatheter $Given 3/5/24 1111)   sodium chloride (PF) 0.9% PF flush 3 mL (has no administration in time range)   calcium carbonate (TUMS) chewable tablet 1,000 mg (has no administration in time range)   Patient is already receiving anticoagulation with heparin, enoxaparin (LOVENOX), warfarin (COUMADIN)  or other anticoagulant medication (has no administration in time range)   acetaminophen (TYLENOL) tablet 650 mg (has no administration in time range)     Or   acetaminophen (TYLENOL) Suppository 650 mg (has no administration in time range)   oxyCODONE IR (ROXICODONE) half-tab 2.5 mg (has no administration in time range)   oxyCODONE (ROXICODONE) tablet 5 mg (has no administration in time range)   HYDROmorphone (DILAUDID) injection 0.2 mg (has no administration in time range)    HYDROmorphone (DILAUDID) injection 0.4 mg (has no administration in time range)   melatonin tablet 5 mg (5 mg Oral $Given 3/5/24 2328)   ondansetron (ZOFRAN ODT) ODT tab 4 mg (has no administration in time range)     Or   ondansetron (ZOFRAN) injection 4 mg (has no administration in time range)   nicotine (NICODERM CQ) 14 MG/24HR 24 hr patch 1 patch (has no administration in time range)   benzocaine-menthol (CHLORASEPTIC) 6-10 MG lozenge 1 lozenge (has no administration in time range)   nitroGLYcerin (NITROSTAT) sublingual tablet 0.4 mg (has no administration in time range)   heparin 25,000 units in 0.45% NaCl 250 mL ANTICOAGULANT infusion (700 Units/hr Intravenous $New Bag 3/5/24 2347)   lactated ringers infusion ( Intravenous $New Bag 3/5/24 2346)   aspirin EC tablet 81 mg (has no administration in time range)   ticagrelor (BRILINTA) tablet 90 mg (90 mg Oral $Given 3/5/24 2328)   metoprolol tartrate (LOPRESSOR) half-tab 12.5 mg (12.5 mg Oral Not Given 3/5/24 2328)   rosuvastatin (CRESTOR) tablet 20 mg (has no administration in time range)   sertraline (ZOLOFT) tablet 50 mg (has no administration in time range)   traZODone (DESYREL) half-tab 50 mg (has no administration in time range)   aspirin (ASA) tablet 325 mg (325 mg Oral $Given 3/5/24 1844)   heparin loading dose for LOW INTENSITY TREATMENT * Give BEFORE starting heparin infusion (3,500 Units Intravenous $Given 3/5/24 2241)        Assessments:  See ED course     Independent Interpretation (X-rays, CTs, rhythm strip):  Chest x-ray: No focal infiltrates or pneumothorax      Consultations/Discussion of Management or Tests:     ED Course as of 03/05/24 2352   Tue Mar 05, 2024   1959 Re-check. Symptom free on exam within 5 minutes of taking SL nitro   2112 Recheck.    2121 Spoke to Tamela Hernandez PA-C who accepts admission for Dr. Boswell       Social Determinants of Health affecting care:   None    Disposition:  The patient was admitted to the hospital under the  care of Dr. Boswell.     Impression & Plan    CMS Diagnoses: None    Medical Decision Making:  Vitals hypertensive on arrival otherwise WNL.  This is a 49-year-old gentleman who presents with the above history.  Consider broad differential including ACS, Dressler syndrome, ventricular free wall aneurysm, stable angina.  His initial troponin is WNL at 10.  As this was drawn 1.5 hours since onset of his pain and given that his pain completely resolved with sublingual nitro, 2-hour troponin is ordered and markedly elevated at greater than 100.  He has no findings on EKG to suggest STEMI.  His other vessels on his recent cath were only minimally affected.  I performed limited bedside echo (had difficulty with transthoracic views but good view through liver window) --I saw no pericardial effusion nor focal wall motion abnormality.  Inflammatory markers are low making Dressler syndrome lower on my differential currently--in addition his pain is not really positional nor pleuritic.  Possible this represents stent thrombus.  Will start on heparin.  He remains chest pain-free throughout ED visit.  Admit to hospital medicine.  Spoke with Tamela Rocha PA-C who kindly accepts.  Patient received 325 aspirin in ED.       Critical Care  The critical condition was: ACS: STEMI/NSTEMI    Critical interventions performed or strongly considered: Arrange Definitive Care: OR/cath lab/neurointervention/IR/endoscopy/dialysis/ICU and Infusion: naloxone, vasopressors, insulin, chronotropics, ionotropics, antiepileptics    Critical care time was 31 minutes exclusive of time spent on separately billable procedures.      Diagnosis:    ICD-10-CM    1. Chest pain, unspecified type  R07.9       2. Troponin level elevated  R79.89                   Mima Magallanes DO  3/5/2024   Mima Walton DO Pappas Richter, Ellen, DO  03/05/24 [id number]

## 2024-03-06 NOTE — PRE-PROCEDURE
GENERAL PRE-PROCEDURE:   Procedure:  Coronary angiogram, possible percutaneous coronary intervention  Date/Time:  3/6/2024 1:00 PM    Verbal consent obtained?: Yes    Written consent obtained?: Yes    Risks and benefits: Risks, benefits and alternatives were discussed    DC Plan: Appropriate discharge home plan in place for patients who are going home after procedure   Consent given by:  Patient  Patient states understanding of procedure being performed: Yes    Patient's understanding of procedure matches consent: Yes    Procedure consent matches procedure scheduled: Yes    Expected level of sedation:  Moderate  Appropriately NPO:  Yes  ASA Class:  2  Mallampati  :  Grade 2- soft palate, base of uvula, tonsillar pillars, and portion of posterior pharyngeal wall visible  Lungs:  Lungs clear with good breath sounds bilaterally  Heart:  Normal heart sounds and rate  History & Physical reviewed:  History and physical reviewed and no updates needed  Statement of review:  I have reviewed the lab findings, diagnostic data, medications, and the plan for sedation  I have examined the patient, reviewed the history, medications and pre procedural tests.Known CAD sp recent AWMI treated wih DAVIAN mid LAD. Troponin levels mildly elevated with no new ECG changes. I have explained to the patient the risks of death, MI, stroke, hematoma, possible urgent bypass surgery for failed PCI, use of stents, thienopyridine agents, possible peripheral vascular complications, arrhythmia, the use of FFR in clinical decision-making and alternative of medical therapy alone in regards to left heart catheterization, left ventriculography, coronary angiography, and possible percutaneous coronary intervention. The patient voiced understanding and wishes to proceed. The patient has a good right radial pulse, normal ulnar pulse and a normal Gerson's sign.

## 2024-03-06 NOTE — PLAN OF CARE
Goal Outcome Evaluation:      Plan of Care Reviewed With: patient    Overall Patient Progress: no changeOverall Patient Progress: no change     Shift: 2am-7am    Vitals:Temp: 97.7  F (36.5  C) Temp src: Oral BP: 109/58 Pulse: 65   Resp: 18 SpO2: 96 % O2 Device: None (Room air)       Orientation: alert and orientated   Pain: denies   Tele: SR BBB  Activity: SBA  Resp: on RA  Diet: NPO  How to take meds: IV   GI & : continent of urine, no bm this shift   Protocol: potassium and heparin   BG: -  Skin: No skin concerns    Lines: 2 PIVs infusing LR @ 75ml/hr and heparin @ 700 units/hr   Other: Trops increasing 10, 100, 400- EKG done, no changes

## 2024-03-06 NOTE — PROGRESS NOTES
[facility]  Hospitalist Progress Note  Brady Cortez MD 03/06/2024    Reason for Stay (Diagnosis): Elevated troponin in a patient with recent MI/CAD         Assessment and Plan:      Summary of Stay: Mingo Melgoza is a 49 year old male with MH significant for CAD, recent anterior wall MI s/p PCI to mid LAD on 1/23, tobacco dependence, nephrolithiasis, depression, who was admitted on 3/5/2024 after presenting for evaluation of chest pain, found to have elevated troponin and admitted on 3/5/2024   Problem List:   Non-ST elevation MI.  Coronary artery disease  -Patient with recent PCI to mid LAD in 1/23.  -Initial troponin was negative, subsequent troponin level increased and peaked at 403.  -Patient's status post coronary angiogram, no intervention done, continue with medical management.  -Continue telemonitoring.  -Started on diet.  -Ambulate the patient and see if chest pain is recurrent.  -EKG if he develops chest pain.  -Continue dual antiplatelet agent with Brilinta and aspirin.  Continue statin, beta-blocker as ordered.    Tobacco use disorder.  -Patient stated he is cutting down on his smoking.  -Still smokes about a pack per day before his first heart attack, now cutting down and on 5 cigarettes a day.    Chronic medical conditions.  Depression/bipolar disorder  History of prior SI      Clinically Significant Risk Factors Present on Admission                # Drug Induced Platelet Defect: home medication list includes an antiplatelet medication                   DVT Prophylaxis: Patient was on heparin drip  Code Status: Full Code.  Discharge Dispo: Home.  Estimated Disch Date / # of Days until Disch: If remains stable likely will be discharged tomorrow if okay with cardiology team.  I discussed with patient at length the plan of care.      Interval History (Subjective):      Patient seen and examined, assumed care today, no chest pain this morning, waiting for coronary angiogram  "earlier, which was lateral done today, no intervention done.                  Physical Exam:      Last Vital Signs:  /63 (BP Location: Left arm, Patient Position: Semi-Fischer's, Cuff Size: Adult Regular)   Pulse 77   Temp 97.5  F (36.4  C) (Oral)   Resp 18   Ht 1.753 m (5' 9\")   Wt 57.8 kg (127 lb 6.4 oz)   SpO2 96%   BMI 18.81 kg/m      I/O last 3 completed shifts:  In: 18.9 [I.V.:18.9]  Out: -   Vitals:    03/05/24 1806 03/06/24 0205   Weight: 58.1 kg (128 lb 1.4 oz) 57.8 kg (127 lb 6.4 oz)     Current Facility-Administered Medications   Medication    acetaminophen (TYLENOL) tablet 650 mg    [START ON 3/7/2024] aspirin EC tablet 81 mg    atropine injection 0.5 mg    benzocaine-menthol (CHLORASEPTIC) 6-10 MG lozenge 1 lozenge    calcium carbonate (TUMS) chewable tablet 1,000 mg    fentaNYL (PF) (SUBLIMAZE) injection 25 mcg    flumazenil (ROMAZICON) injection 0.2 mg    HOLD:  Metformin and metformin containing medications if patient received IV contrast with acute kidney injury or severe chronic kidney disease (stage IV or stage V; i.e., eGFR less than 30)    HYDROmorphone (DILAUDID) injection 0.2 mg    HYDROmorphone (DILAUDID) injection 0.4 mg    lactated ringers infusion    melatonin tablet 5 mg    metoprolol tartrate (LOPRESSOR) half-tab 12.5 mg    midazolam (VERSED) injection 0.5 mg    naloxone (NARCAN) injection 0.2 mg    Or    naloxone (NARCAN) injection 0.4 mg    Or    naloxone (NARCAN) injection 0.2 mg    Or    naloxone (NARCAN) injection 0.4 mg    [START ON 3/7/2024] nitroGLYcerin (NITROSTAT) sublingual tablet 0.4 mg    ondansetron (ZOFRAN ODT) ODT tab 4 mg    Or    ondansetron (ZOFRAN) injection 4 mg    oxyCODONE (ROXICODONE) tablet 5 mg    Or    oxyCODONE (ROXICODONE) tablet 10 mg    [START ON 3/7/2024] rosuvastatin (CRESTOR) tablet 20 mg    [START ON 3/7/2024] sertraline (ZOLOFT) tablet 50 mg    sodium chloride 0.9 % infusion    sodium chloride 0.9% BOLUS 250 mL    ticagrelor (BRILINTA) " tablet 90 mg    traZODone (DESYREL) tablet 50 mg       Constitutional: Awake, alert, cooperative, no apparent distress.     Respiratory: Clear to auscultation bilaterally, no crackles or wheezing   Cardiovascular: Regular rate and rhythm, normal S1 and S2, and no murmur noted   Abdomen: Normal bowel sounds, soft, non-distended, non-tender   Skin: No rashes, no cyanosis, dry to touch   Neuro: Alert and oriented x3, no weakness, numbness, memory loss   Extremities: No edema, normal range of motion   Other(s):HEENT Pink nonicteric moist oral mucosa       All other systems: Negative          Medications:      All current medications were reviewed with changes reflected in problem list.         Data:      All new lab and imaging data was reviewed.   Labs:  Recent Labs   Lab 03/06/24  1132 03/06/24  0709 03/05/24  1809   NA  --  139 138   POTASSIUM  --  3.8 3.6   CHLORIDE  --  105 101   CO2  --  26 26   ANIONGAP  --  8 11   GLC 81 81 123*   BUN  --  13.7 16.3   CR  --  0.74 0.82   GFRESTIMATED  --  >90 >90   LEE  --  8.8 9.1     Recent Labs   Lab 03/06/24  0709 03/05/24  2250 03/05/24  1809   WBC 4.6 6.5 6.6   HGB 13.6 13.6 14.5   HCT 40.2 39.6* 42.4   MCV 93 93 93    197 208     Recent Labs   Lab 03/06/24  1132 03/06/24  0709 03/05/24  1809   GLC 81 81 123*      Imaging:   Results for orders placed or performed during the hospital encounter of 03/05/24   Chest XR,  PA & LAT    Narrative    EXAM: XR CHEST 2 VIEWS  LOCATION: [facility]  DATE: 3/5/2024    INDICATION: chest pain 6 weeks s p stent  COMPARISON: Chest radiograph 01/23/2024.      Impression    IMPRESSION: Stable size of cardiomediastinal silhouette with coronary stent noted. There is some possible upper lobe predominant oligemia and areas of fibrosis, suggestive of underlying emphysema. No definite airspace consolidation, pleural effusion or   pneumothorax. No acute bony abnormality.   Echocardiogram Complete    Narrative     [id number]  [id number]  [id number]  [id number]^CHEYANNE^YANCY^JAGDEEP     [facility]  Echocardiography Laboratory  [address] [place] [address]     Name: APPLE ALCALA  MRN: [id number]  : 1975  Study Date: 2024 10:00 AM  Age: 49 yrs  Gender: Male  Patient Location: [facility]  Reason For Study: Chest Pain  Ordering Physician: YANCY EDWARD  Performed By: Neema Laird     BSA: 1.7 m2  Height: 69 in  Weight: 128 lb  HR: 58  BP: 132/82 mmHg  ______________________________________________________________________________  Procedure  Complete Portable Echo Adult. Optison (NDC #[id number]) given intravenously.  Poor acoustic windows.  ______________________________________________________________________________  Interpretation Summary     The left ventricle is normal in structure, function and size.  Normal left ventricular wall motion  The study was technically difficult. Contrast was used without apparent  complications.  ______________________________________________________________________________  Left Ventricle  The left ventricle is normal in structure, function and size. Left ventricular  diastolic function is normal. Normal left ventricular wall motion.     Right Ventricle  The right ventricle is normal in structure, function and size.     Atria  Normal left atrial size. Right atrial size is normal.     Mitral Valve  The mitral valve is normal in structure and function.     Tricuspid Valve  Normal tricuspid valve.     Aortic Valve  The aortic valve is normal in structure and function.     Pulmonic Valve  Normal pulmonic valve.     Vessels  The aortic root is normal size.     Pericardium  There is no pericardial effusion.     ______________________________________________________________________________  MMode/2D Measurements & Calculations  IVSd: 0.83 cm  LVIDd: 4.5 cm  LVIDs: 3.1 cm  LVPWd: 1.0 cm  FS: 30.2 %     LV mass(C)d: 138.5 grams  LV mass(C)dI: 81.1 grams/m2  Ao  root diam: 3.5 cm  LVOT diam: 2.0 cm  LVOT area: 3.0 cm2  Ao root diam index Ht(cm/m): 2.0  Ao root diam index BSA (cm/m2): 2.1  LA Volume (BP): 31.0 ml  LA Volume Index (BP): 18.1 ml/m2  RV Base: 2.5 cm  RWT: 0.47  TAPSE: 2.3 cm     Doppler Measurements & Calculations  MV E max hernán: 59.6 cm/sec  MV A max hernán: 54.8 cm/sec  MV E/A: 1.1  MV max P.9 mmHg  MV mean P.62 mmHg  MV V2 VTI: 23.9 cm  MVA(VTI): 2.7 cm2  MV dec time: 0.24 sec  Ao V2 max: 110.0 cm/sec  Ao max P.0 mmHg  Ao V2 mean: 74.3 cm/sec  Ao mean PG: 3.0 mmHg  Ao V2 VTI: 24.7 cm  GINA(I,D): 2.6 cm2  GINA(V,D): 2.7 cm2  LV V1 max PG: 3.8 mmHg  LV V1 max: 98.1 cm/sec  LV V1 VTI: 21.3 cm  SV(LVOT): 63.8 ml  SI(LVOT): 37.3 ml/m2  PA V2 max: 84.7 cm/sec  PA max P.9 mmHg  PA acc time: 0.15 sec  AV Hernán Ratio (DI): 0.89  GINA Index (cm2/m2): 1.5  E/E' av.4  Lateral E/e': 4.8  Medial E/e': 6.0  RV S Hernán: 12.0 cm/sec     ______________________________________________________________________________  Report approved by: Govind Higgins 2024 11:29 AM         Cardiac Catheterization    Narrative    Coronary artery disease : Status post recent anterior wall MI treated with   drug-eluting stent in mid LAD.  Presentation with chest pain and mild   troponin rise.    Findings  Left main: Normal  LAD: Stented site in the mid LAD widely patent over the orifice of both   first and second diagonal branches.  Ostial 60% narrowings at origin of   first and second marginal branch unchanged since postintervention.  Circumflex: Mild atheromatous change with no focal narrowing  Right coronary: Dominant.  Atheromatous change without focal narrowing    Assessment: There has been no change in anatomy since the patient's   postintervention films on 2024.  In particular, the stented site in   the LAD is widely patent with moderate narrowing of the ostia of both the   first and second diagonal branches but NEEL-3 flow in all segments.

## 2024-03-07 VITALS
RESPIRATION RATE: 17 BRPM | HEIGHT: 69 IN | SYSTOLIC BLOOD PRESSURE: 106 MMHG | HEART RATE: 86 BPM | TEMPERATURE: 97.5 F | WEIGHT: 127.4 LBS | BODY MASS INDEX: 18.87 KG/M2 | DIASTOLIC BLOOD PRESSURE: 54 MMHG | OXYGEN SATURATION: 95 %

## 2024-03-07 LAB
ATRIAL RATE - MUSE: 64 BPM
DIASTOLIC BLOOD PRESSURE - MUSE: NORMAL MMHG
INTERPRETATION ECG - MUSE: NORMAL
P AXIS - MUSE: 79 DEGREES
PR INTERVAL - MUSE: 156 MS
QRS DURATION - MUSE: 84 MS
QT - MUSE: 406 MS
QTC - MUSE: 418 MS
R AXIS - MUSE: 73 DEGREES
SYSTOLIC BLOOD PRESSURE - MUSE: NORMAL MMHG
T AXIS - MUSE: 76 DEGREES
VENTRICULAR RATE- MUSE: 64 BPM

## 2024-03-07 PROCEDURE — 250N000013 HC RX MED GY IP 250 OP 250 PS 637: Performed by: INTERNAL MEDICINE

## 2024-03-07 PROCEDURE — 99239 HOSP IP/OBS DSCHRG MGMT >30: CPT | Performed by: INTERNAL MEDICINE

## 2024-03-07 PROCEDURE — 258N000003 HC RX IP 258 OP 636: Performed by: INTERNAL MEDICINE

## 2024-03-07 PROCEDURE — 99233 SBSQ HOSP IP/OBS HIGH 50: CPT | Mod: FS | Performed by: PHYSICIAN ASSISTANT

## 2024-03-07 RX ORDER — ROSUVASTATIN CALCIUM 10 MG/1
40 TABLET, COATED ORAL DAILY
Status: DISCONTINUED | OUTPATIENT
Start: 2024-03-08 | End: 2024-03-07 | Stop reason: HOSPADM

## 2024-03-07 RX ORDER — AMLODIPINE BESYLATE 2.5 MG/1
2.5 TABLET ORAL DAILY
Status: DISCONTINUED | OUTPATIENT
Start: 2024-03-07 | End: 2024-03-07 | Stop reason: HOSPADM

## 2024-03-07 RX ORDER — AMLODIPINE BESYLATE 2.5 MG/1
2.5 TABLET ORAL DAILY
Qty: 30 TABLET | Refills: 1 | Status: SHIPPED | OUTPATIENT
Start: 2024-03-07

## 2024-03-07 RX ORDER — METOPROLOL SUCCINATE 25 MG/1
12.5 TABLET, EXTENDED RELEASE ORAL DAILY
Qty: 15 TABLET | Refills: 1 | Status: SHIPPED | OUTPATIENT
Start: 2024-03-08

## 2024-03-07 RX ADMIN — SERTRALINE HYDROCHLORIDE 50 MG: 50 TABLET ORAL at 08:33

## 2024-03-07 RX ADMIN — SODIUM CHLORIDE, POTASSIUM CHLORIDE, SODIUM LACTATE AND CALCIUM CHLORIDE 500 ML: 600; 310; 30; 20 INJECTION, SOLUTION INTRAVENOUS at 08:34

## 2024-03-07 RX ADMIN — ASPIRIN 81 MG: 81 TABLET, COATED ORAL at 08:33

## 2024-03-07 RX ADMIN — TICAGRELOR 90 MG: 90 TABLET ORAL at 08:33

## 2024-03-07 RX ADMIN — METOPROLOL SUCCINATE 12.5 MG: 25 TABLET, EXTENDED RELEASE ORAL at 08:33

## 2024-03-07 RX ADMIN — ROSUVASTATIN 20 MG: 10 TABLET, FILM COATED ORAL at 08:33

## 2024-03-07 RX ADMIN — AMLODIPINE BESYLATE 2.5 MG: 2.5 TABLET ORAL at 13:32

## 2024-03-07 ASSESSMENT — ACTIVITIES OF DAILY LIVING (ADL)
ADLS_ACUITY_SCORE: 22

## 2024-03-07 NOTE — PROGRESS NOTES
[facility]  Cardiology Progress Note    Outpatient cardiologist: He initially saw Dr. Harrison in [place], but lives in [place] and wants to establish down here    Date of Service (when I saw the patient): 03/07/2024    Summary: Mingo Melgoza is a 49 year old male with history of smoking, dyslipidemia, FH of early ASCVD, coronary artery disease with anterior STEMI 1/23/2024 status post DAVIAN to mid LAD who presents 3/5/24 with chest pain similar to his prior angina.     Cardiac history notable for anterior STEMI 1/23/2024.  Coronary angiogram 1/23/2024 demonstrated a 100% mid LAD culprit lesion for which he underwent PCI with DAVIAN, otherwise was noted to have a 20% mid left circumflex lesion, proximal to mid RCA 25% stenosis.  TTE 1/20/2024 normal LV function LVEF 55 to 60%, normal RV function, no significant valvular abnormalities.    SL nitroglycerin relieved the pain.     Trops elevated and peaked at 403.         Interval History   Feeling better         Assessment & Plan   NSTEMI  CAD, history of anterior STEMI 1/23/24 s/p DAVIAN to mLAD  - Trops peaked at 403  - Echo 3/6/24: normal LV function, no RWMAs  - Cardiac cath 3/6/24: recent stent is widely patent, no new lesions   - Continue DAPT  - Low dose Toprol XL for recent MI and CAD. Low normal BP limits meds.   - Start low dose amlodipine 2.5 mg for possible spasm. Low normal BP limits meds.   - Currently on rosuvastatin 20 mg.  LDL is 99.  Goal is 50-70, so would increase Crestor to 40 mg. Repeat FLP/ALT in about 6 weeks       Ongoing smoking  - Cessation encouraged      Dyslipidemia  - Statin, see above      OK to discharge to home today.       Follow up:  Will arrange follow up.  He prefers [place]        Denise Ngo PA-C      Patient Active Problem List   Diagnosis    Percutaneous transluminal coronary angioplasty status    Troponin level elevated    Chest pain, unspecified type       Physical Exam   Temp: 97.5  F (36.4  C) Temp  src: Oral BP: 114/58 Pulse: 86   Resp: 17 SpO2: 95 % O2 Device: None (Room air) Oxygen Delivery: 3 LPM  Vitals:    24 1806 24 0205   Weight: 58.1 kg (128 lb 1.4 oz) 57.8 kg (127 lb 6.4 oz)     Vital Signs with Ranges  Temp:  [97.4  F (36.3  C)-97.7  F (36.5  C)] 97.5  F (36.4  C)  Pulse:  [57-86] 86  Resp:  [14-18] 17  BP: ()/(55-65) 114/58  SpO2:  [94 %-97 %] 95 %  No intake/output data recorded.    Constitutional: NAD.   Respiratory: CTAB.   Cardiovascular: RRR, s1s2, no sig murmur  GI: soft, BS+  Skin: warm, no rashes  Musculoskeletal: Moving all extremities. RRA site: 2+ pulse  Neurologic: Alert, oriented x 3  Neuropsychiatric: Normal affect       Data   Recent Labs   Lab 24  1132 24  0709 24  2250 24  1809   WBC  --  4.6 6.5 6.6   HGB  --  13.6 13.6 14.5   MCV  --  93 93 93   PLT  --  178 197 208   NA  --  139  --  138   POTASSIUM  --  3.8  --  3.6   CHLORIDE  --  105  --  101   CO2  --  26  --  26   BUN  --  13.7  --  16.3   CR  --  0.74  --  0.82   ANIONGAP  --  8  --  11   LEE  --  8.8  --  9.1   GLC 81 81  --  123*       Recent Results (from the past 24 hour(s))   Echocardiogram Complete    Narrative    [id number]  [id number]  [id number]  [id number]^CHEYANNE^YANCY^JAGDEEP     [facility]  Echocardiography Laboratory  [address] [place] [address]     Name: APPLE ALCALA  MRN: [id number]  : 1975  Study Date: 2024 10:00 AM  Age: 49 yrs  Gender: Male  Patient Location: [facility]  Reason For Study: Chest Pain  Ordering Physician: YANCY EDWARD  Performed By: Neema Laird     BSA: 1.7 m2  Height: 69 in  Weight: 128 lb  HR: 58  BP: 132/82 mmHg  ______________________________________________________________________________  Procedure  Complete Portable Echo Adult. Optison (NDC #[id number]) given intravenously.  Poor acoustic windows.  ______________________________________________________________________________  Interpretation  Summary     The left ventricle is normal in structure, function and size.  Normal left ventricular wall motion  The study was technically difficult. Contrast was used without apparent  complications.  ______________________________________________________________________________  Left Ventricle  The left ventricle is normal in structure, function and size. Left ventricular  diastolic function is normal. Normal left ventricular wall motion.     Right Ventricle  The right ventricle is normal in structure, function and size.     Atria  Normal left atrial size. Right atrial size is normal.     Mitral Valve  The mitral valve is normal in structure and function.     Tricuspid Valve  Normal tricuspid valve.     Aortic Valve  The aortic valve is normal in structure and function.     Pulmonic Valve  Normal pulmonic valve.     Vessels  The aortic root is normal size.     Pericardium  There is no pericardial effusion.     ______________________________________________________________________________  MMode/2D Measurements & Calculations  IVSd: 0.83 cm  LVIDd: 4.5 cm  LVIDs: 3.1 cm  LVPWd: 1.0 cm  FS: 30.2 %     LV mass(C)d: 138.5 grams  LV mass(C)dI: 81.1 grams/m2  Ao root diam: 3.5 cm  LVOT diam: 2.0 cm  LVOT area: 3.0 cm2  Ao root diam index Ht(cm/m): 2.0  Ao root diam index BSA (cm/m2): 2.1  LA Volume (BP): 31.0 ml  LA Volume Index (BP): 18.1 ml/m2  RV Base: 2.5 cm  RWT: 0.47  TAPSE: 2.3 cm     Doppler Measurements & Calculations  MV E max jigna: 59.6 cm/sec  MV A max jigna: 54.8 cm/sec  MV E/A: 1.1  MV max P.9 mmHg  MV mean P.62 mmHg  MV V2 VTI: 23.9 cm  MVA(VTI): 2.7 cm2  MV dec time: 0.24 sec  Ao V2 max: 110.0 cm/sec  Ao max P.0 mmHg  Ao V2 mean: 74.3 cm/sec  Ao mean PG: 3.0 mmHg  Ao V2 VTI: 24.7 cm  GINA(I,D): 2.6 cm2  GINA(V,D): 2.7 cm2  LV V1 max PG: 3.8 mmHg  LV V1 max: 98.1 cm/sec  LV V1 VTI: 21.3 cm  SV(LVOT): 63.8 ml  SI(LVOT): 37.3 ml/m2  PA V2 max: 84.7 cm/sec  PA max P.9 mmHg  PA acc time: 0.15  sec  AV Hernán Ratio (DI): 0.89  GINA Index (cm2/m2): 1.5  E/E' av.4  Lateral E/e': 4.8  Medial E/e': 6.0  RV S Hernán: 12.0 cm/sec     ______________________________________________________________________________  Report approved by: Govind Higgins 2024 11:29 AM         Cardiac Catheterization    Narrative    Coronary artery disease : Status post recent anterior wall MI treated with   drug-eluting stent in mid LAD.  Presentation with chest pain and mild   troponin rise.    Findings  Left main: Normal  LAD: Stented site in the mid LAD widely patent over the orifice of both   first and second diagonal branches.  Ostial 60% narrowings at origin of   first and second marginal branch unchanged since postintervention.  Circumflex: Mild atheromatous change with no focal narrowing  Right coronary: Dominant.  Atheromatous change without focal narrowing    Assessment: There has been no change in anatomy since the patient's   postintervention films on 2024.  In particular, the stented site in   the LAD is widely patent with moderate narrowing of the ostia of both the   first and second diagonal branches but NEEL-3 flow in all segments.         Medications      amLODIPine  2.5 mg Oral Daily    aspirin  81 mg Oral Daily    lactated ringers  500 mL Intravenous Once    melatonin  5 mg Oral At Bedtime    metoprolol succinate ER  12.5 mg Oral Daily    rosuvastatin  20 mg Oral Daily    sertraline  50 mg Oral Daily    ticagrelor  90 mg Oral Q12H

## 2024-03-07 NOTE — PLAN OF CARE
Goal Outcome Evaluation:      Plan of Care Reviewed With: patient    Overall Patient Progress: improvingOverall Patient Progress: improving         VSS on ra. B/P softer. A/O. Denies pain, sob, TRISTAN, CP or palpations. Angio site c/d/I. No hematoma. CMS intact. LPIV infusing. RPIV SL. Tele SB/SR. Regular diet. Independent in room. Anxious to discharge.

## 2024-03-07 NOTE — PROGRESS NOTES
"/58 (BP Location: Left arm, Patient Position: Semi-Fischer's)   Pulse 86   Temp 97.5  F (36.4  C) (Oral)   Resp 17   Ht 1.753 m (5' 9\")   Wt 57.8 kg (127 lb 6.4 oz)   SpO2 95%   BMI 18.81 kg/m      Patient alert and decision making. All belongings returned. Lines / drains assessed and removed. Pt received discharge medications from [facility]. Transport home by self / persons.    "

## 2024-03-07 NOTE — DISCHARGE SUMMARY
[facility]  Discharge Summary  Name: Mingo Melgoza    MRN: [id number]  YOB: 1975    Age: 49 year old  Date of Discharge:  3/7/2024  Date of Admission: 3/5/2024  Primary Care Provider: No Ref-Primary, Physician  Discharge Physician:  Brady Cortez M.D  Discharging Service:  Hospitalist      Discharge Diagnosis:  Mingo Melgoza is a 49 year old male with MH significant for CAD, recent anterior wall MI s/p PCI to mid LAD on 1/23, tobacco dependence, nephrolithiasis, depression, who was admitted on 3/5/2024 after presenting for evaluation of chest pain, found to have elevated troponin and admitted on 3/5/2024   Problem List:   Non-ST elevation MI  Coronary artery disease  -Patient with recent PCI to mid LAD in 1/23.  -Initial troponin was negative, subsequent troponin level increased and peaked at 403.  -Patient's has had another coronary angiogram this time, but there was no intervention done.  -He will continue with medical management.  -Advised to continue his dual antiplatelet agent: Brilinta and ASA.  -he will be on his PTA statin, beta-blocker as ordered.     Tobacco use disorder.  -Patient stated he is cutting down on his smoking.  -He used to smokes about a pack per day before his first heart attack, now cut down to 5 cigarettes a day.  -Advised on the need for complete cessation of smoking.    Chronic medical conditions.  Depression/bipolar disorder  History of prior SI          Other Diagnosis:  none     Discharge Disposition:  Discharged to home     Allergies:  No Known Allergies     Discharge Medications:   Current Discharge Medication List        START taking these medications    Details   amLODIPine (NORVASC) 2.5 MG tablet Take 1 tablet (2.5 mg) by mouth daily  Qty: 30 tablet, Refills: 1    Associated Diagnoses: Chest pain, unspecified type; NSTEMI (non-ST elevated myocardial infarction) (H)      metoprolol succinate ER (TOPROL XL) 25 MG 24 hr tablet Take 0.5 tablets (12.5  "mg) by mouth daily  Qty: 15 tablet, Refills: 1    Associated Diagnoses: NSTEMI (non-ST elevated myocardial infarction) (H)           CONTINUE these medications which have NOT CHANGED    Details   aspirin 81 MG EC tablet Take 1 tablet (81 mg) by mouth daily Start tomorrow.  Qty: 30 tablet, Refills: 3    Associated Diagnoses: Coronary artery disease involving native coronary artery of native heart without angina pectoris      nitroGLYcerin (NITROSTAT) 0.4 MG sublingual tablet For chest pain place 1 tablet under the tongue every 5 minutes for 3 doses. If symptoms persist 5 minutes after 1st dose call 911.  Qty: 30 tablet, Refills: 1    Associated Diagnoses: Coronary artery disease involving native coronary artery of native heart without angina pectoris      rosuvastatin (CRESTOR) 20 MG tablet Take 1 tablet (20 mg) by mouth daily  Qty: 90 tablet, Refills: 3    Associated Diagnoses: Coronary artery disease involving native coronary artery of native heart without angina pectoris      sertraline (ZOLOFT) 50 MG tablet Take 1 tablet (50 mg) by mouth daily  Qty: 30 tablet, Refills: 1    Associated Diagnoses: Depression, unspecified depression type      ticagrelor (BRILINTA) 90 MG tablet Take 1 tablet (90 mg) by mouth every 12 hours  Qty: 60 tablet, Refills: 1    Associated Diagnoses: Coronary artery disease involving native coronary artery of native heart without angina pectoris           STOP taking these medications       metoprolol tartrate (LOPRESSOR) 25 MG tablet Comments:   Reason for Stopping:                Condition on Discharge:  Discharge condition: Stable   Discharge vitals: Blood pressure 114/58, pulse 86, temperature 97.5  F (36.4  C), temperature source Oral, resp. rate 17, height 1.753 m (5' 9\"), weight 57.8 kg (127 lb 6.4 oz), SpO2 95%.   Code status on discharge: Full Code     History of Illness:  See detailed admission note for full details.    Significant Physical Exam Findings:    Constitutional: Awake, " alert, cooperative, no apparent distress.      Respiratory: Clear to auscultation bilaterally, no crackles or wheezing   Cardiovascular: Regular rate and rhythm, normal S1 and S2, and no murmur noted   Abdomen: Normal bowel sounds, soft, non-distended, non-tender   Skin: No rashes, no cyanosis, dry to touch   Neuro: Alert and oriented x3, no weakness, numbness, memory loss   Extremities: No edema, normal range of motion   Other(s):HEENT Pink nonicteric moist oral mucosa         All other systems: Negative       Procedures other than Imaging:  Coronary angiogram     Imaging:  Results for orders placed or performed during the hospital encounter of 24   Chest XR,  PA & LAT    Narrative    EXAM: XR CHEST 2 VIEWS  LOCATION: [facility]  DATE: 3/5/2024    INDICATION: chest pain 6 weeks s p stent  COMPARISON: Chest radiograph 2024.      Impression    IMPRESSION: Stable size of cardiomediastinal silhouette with coronary stent noted. There is some possible upper lobe predominant oligemia and areas of fibrosis, suggestive of underlying emphysema. No definite airspace consolidation, pleural effusion or   pneumothorax. No acute bony abnormality.   Echocardiogram Complete    Narrative    [id number]  [id number]  [id number]  [id number]^CHEYANNE^YANCY^JAGDEEP     [facility]  Echocardiography Laboratory  [address] [place] [address]     Name: APPLE ALCALA  MRN: [id number]  : 1975  Study Date: 2024 10:00 AM  Age: 49 yrs  Gender: Male  Patient Location: [facility]  Reason For Study: Chest Pain  Ordering Physician: YANCY EDWARD  Performed By: Neema Laird     BSA: 1.7 m2  Height: 69 in  Weight: 128 lb  HR: 58  BP: 132/82 mmHg  ______________________________________________________________________________  Procedure  Complete Portable Echo Adult. Optison (NDC #[id number]) given intravenously.  Poor acoustic  windows.  ______________________________________________________________________________  Interpretation Summary     The left ventricle is normal in structure, function and size.  Normal left ventricular wall motion  The study was technically difficult. Contrast was used without apparent  complications.  ______________________________________________________________________________  Left Ventricle  The left ventricle is normal in structure, function and size. Left ventricular  diastolic function is normal. Normal left ventricular wall motion.     Right Ventricle  The right ventricle is normal in structure, function and size.     Atria  Normal left atrial size. Right atrial size is normal.     Mitral Valve  The mitral valve is normal in structure and function.     Tricuspid Valve  Normal tricuspid valve.     Aortic Valve  The aortic valve is normal in structure and function.     Pulmonic Valve  Normal pulmonic valve.     Vessels  The aortic root is normal size.     Pericardium  There is no pericardial effusion.     ______________________________________________________________________________  MMode/2D Measurements & Calculations  IVSd: 0.83 cm  LVIDd: 4.5 cm  LVIDs: 3.1 cm  LVPWd: 1.0 cm  FS: 30.2 %     LV mass(C)d: 138.5 grams  LV mass(C)dI: 81.1 grams/m2  Ao root diam: 3.5 cm  LVOT diam: 2.0 cm  LVOT area: 3.0 cm2  Ao root diam index Ht(cm/m): 2.0  Ao root diam index BSA (cm/m2): 2.1  LA Volume (BP): 31.0 ml  LA Volume Index (BP): 18.1 ml/m2  RV Base: 2.5 cm  RWT: 0.47  TAPSE: 2.3 cm     Doppler Measurements & Calculations  MV E max jigna: 59.6 cm/sec  MV A max jigna: 54.8 cm/sec  MV E/A: 1.1  MV max P.9 mmHg  MV mean P.62 mmHg  MV V2 VTI: 23.9 cm  MVA(VTI): 2.7 cm2  MV dec time: 0.24 sec  Ao V2 max: 110.0 cm/sec  Ao max P.0 mmHg  Ao V2 mean: 74.3 cm/sec  Ao mean PG: 3.0 mmHg  Ao V2 VTI: 24.7 cm  GINA(I,D): 2.6 cm2  GINA(V,D): 2.7 cm2  LV V1 max PG: 3.8 mmHg  LV V1 max: 98.1 cm/sec  LV V1 VTI: 21.3  cm  SV(LVOT): 63.8 ml  SI(LVOT): 37.3 ml/m2  PA V2 max: 84.7 cm/sec  PA max P.9 mmHg  PA acc time: 0.15 sec  AV Hernán Ratio (DI): 0.89  GINA Index (cm2/m2): 1.5  E/E' av.4  Lateral E/e': 4.8  Medial E/e': 6.0  RV S Hernán: 12.0 cm/sec     ______________________________________________________________________________  Report approved by: Govind Higgins 2024 11:29 AM         Cardiac Catheterization    Narrative    Coronary artery disease : Status post recent anterior wall MI treated with   drug-eluting stent in mid LAD.  Presentation with chest pain and mild   troponin rise.    Findings  Left main: Normal  LAD: Stented site in the mid LAD widely patent over the orifice of both   first and second diagonal branches.  Ostial 60% narrowings at origin of   first and second marginal branch unchanged since postintervention.  Circumflex: Mild atheromatous change with no focal narrowing  Right coronary: Dominant.  Atheromatous change without focal narrowing    Assessment: There has been no change in anatomy since the patient's   postintervention films on 2024.  In particular, the stented site in   the LAD is widely patent with moderate narrowing of the ostia of both the   first and second diagonal branches but NEEL-3 flow in all segments.          Consultations:  Consultation during this admission received from cardiology.     Recent Lab Results:  Recent Labs   Lab 24  0709 24  2250 24  1809   WBC 4.6 6.5 6.6   HGB 13.6 13.6 14.5   HCT 40.2 39.6* 42.4   MCV 93 93 93    197 208     Recent Labs   Lab 24  1132 24  0709 24  1809   NA  --  139 138   POTASSIUM  --  3.8 3.6   CHLORIDE  --  105 101   CO2  --  26 26   ANIONGAP  --  8 11   GLC 81 81 123*   BUN  --  13.7 16.3   CR  --  0.74 0.82   GFRESTIMATED  --  >90 >90   LEE  --  8.8 9.1          Pending Results:    Unresulted Labs Ordered in the Past 30 Days of this Admission       No orders found from 2024 to 3/6/2024.                 Lipid Profile     ALT     Follow-Up with Cardiology EVELYN      Follow-Up with Cardiology      Medication Instructions - Anticoagulants    Do NOT stop your aspirin or platelet inhibitor unless directed by your Cardiologist.  These medications help to prevent platelets in your blood from sticking together and forming a clot.  Examples of these medications are:  Ticagrelor (Brilinta), Clopidigrel (Plavix), Prasugrel (Effient)     When to call - Contact the Heart Clinic    You may experience symptoms that require follow-up before your scheduled appointment. Contact the Heart Clinic if you develop: Fever over 100.4o Fahrenheit, that lasts more than one day; Redness, heat, or pus at the puncture site; Change in color or temperature in your hand or arm.     When to call - Reasons to Call 911    If your wrist puncture site starts bleeding after discharge, sit down and apply firm pressure with your thumb against the puncture site and fingers against the back of the wrist for 10 minutes. If the bleeding stops, continue to rest, keeping your wrist still for 2 hours. Notify your doctor as soon as possible.  IF BLEEDING DOES NOT STOP OR THERE IS A LARGER AMOUNT OF BLEEDING OR SPURTING CALL 9-1-1 immediately.DO NOT drive yourself to the hospital.     Precautions - Lifting    DO NOT lift more than 5 pounds with affected arm for 48 hours     Precautions - Household Activities    Avoid excessive bending or movement of your wrist for 72 hours.  Do not subject hand/arm to any forceful movements for 24 hours, such as supporting weight when rising from a chair or bed.     Remove the band-aid on the puncture site after 24 hours and leave open to air. If minor oozing, you may apply a band-aid and remove after 12 hours.     Precautions - Active Sports Activities    DO NOT engage in vigorous exercise using your affected arm for 3 days after discharge.  This includes golf, tennis or swimming.     Precautions - Operating yard  equipment or vehicles    Do not operate a chainsaw, lawnmower, motorcycle, or all-terrain vehicle for 48 hours after the procedure.     Precautions - Elective Dental Work    NO elective dental work for 6 weeks after receiving a stent.     Comfort and Pain Management - Bruising after Surgery    Expect mild tingling of hand and tenderness at the wrist puncture site for up to 3 days. You may take Tylenol or a pain medicine recommended by your doctor.     Activity - Cardiac Rehab    You are encouraged to enroll in an Outpatient Cardiac Rehab program after discharge from the hospital.  Our Cardiac Rehab staff may visit briefly with you while you're in the hospital.  If they miss you, someone will contact you after you are home.     Return to Driving    Driving is NOT permitted for 24 hours after surgery     Return to work    You may return to work after 72 hours if you are feeling well and your job does not involve heavy lifting.     Shower / Bathing    You may shower on the day after your procedure.  DO NOT soak of wrist with the puncture site in water for 3 days to prevent infection. DO NOT take a tub bath or wash dishes for 3 days after the procedure     Dressing Removal    Remove the band-aid on the puncture site after 24 hours and leave open to air. If minor oozing, you may apply a band-aid and remove after 12 hours     Reason for your hospital stay    NSTEMI     Follow-up and recommended labs and tests     Follow up with primary care provider, Physician No Ref-Primary, within 7 days for hospital follow- up.   Follow up with cardiology as instructed.     Activity    Your activity upon discharge: activity as tolerated     Diet    Follow this diet upon discharge: Orders Placed This Encounter      Advance Diet as Tolerated: Regular Diet Adult         Total time spent in face to face contact with the patient and coordinating discharge was: >30 Minutes.

## 2024-03-08 ENCOUNTER — PATIENT OUTREACH (OUTPATIENT)
Dept: CARE COORDINATION | Facility: CLINIC | Age: 49
End: 2024-03-08
Payer: COMMERCIAL

## 2024-03-08 ENCOUNTER — TELEPHONE (OUTPATIENT)
Dept: CARDIOLOGY | Facility: CLINIC | Age: 49
End: 2024-03-08
Payer: COMMERCIAL

## 2024-03-08 NOTE — TELEPHONE ENCOUNTER
[facility]    PMH: smoking, dyslipidemia, FH of early ASCVD, coronary artery disease with anterior STEMI 1/23/2024 status post DAVIAN to mid LAD.    1/20/24: Echo showed EF of 55 to 60%, normal RV function, no significant valvular abnormalities.     3/7/24: Coronary angiogram via RRA showed:    Left main: Normal  LAD: Stented site in the mid LAD widely patent over the orifice of both first and second diagonal branches. Ostial 60% narrowings at origin of first and second marginal branch unchanged since postintervention.  Circumflex: Mild atheromatous change with no focal narrowing  Right coronary: Dominant.  Atheromatous change without focal narrowing     Assessment: There has been no change in anatomy since the patient's post intervention films on 1/23/2024.  In particular, the stented site in the LAD is widely patent with moderate narrowing of the ostia of both the first and second diagonal branches but NELE-3 flow in all segments.    Pt was started on Norvasc and PTA Metoprolol tartrate changed to succinate at time of discharge.    Called patient to discuss any post hospital d/c questions he may have, review medication changes, and confirm f/u appts. Patient denied any questions regarding new medications or changes with their PTA medications.    Patient denied any SOB, chest pain or lightheadedness.     RRA cardiac cath site is without bleeding, swelling, redness or signs of infection.     RN confirmed with patient that he is scheduled for labs on 4/8/24 at 0815, followed with an OV on 4/9/24 at 0855 with LUKAS Marga Diaz at our [place] Office. Dr. Harrison's Team RN phone number provided.    Patient advised to call clinic with any cardiac related questions or concerns prior to this lukas't. Patient verbalized understanding and agreed with plan. VIDAL Burton RN.

## 2024-03-08 NOTE — PROGRESS NOTES
Connected Care Resource Center: [facility]    Background: Transitional Care Management program identified per system criteria and reviewed by [facility] team for possible outreach.    Assessment: Upon chart review, [facility] Team member will not proceed with patient outreach related to this episode of Transitional Care Management program due to reason below:    Patient has active communication with a nurse, provider or care team for reason of post-hospital follow up plan.  Outreach call by CCR team not indicated to minimize duplicative efforts.     See telephone encounter with Cardiology for Post Discharge Phone Call on 3/8/2024.     Plan: Transitional Care Management episode addressed appropriately per reason noted above.      Savanah Ponce RN  Connected [facility] [place] [facility] [place], [facility]    *Connected Care Resource Team does NOT follow patient ongoing. Referrals are identified based on internal discharge reports and the outreach is to ensure patient has an understanding of their discharge instructions.

## 2024-03-17 ENCOUNTER — HEALTH MAINTENANCE LETTER (OUTPATIENT)
Age: 49
End: 2024-03-17

## 2024-05-10 ENCOUNTER — HOSPITAL ENCOUNTER (EMERGENCY)
Facility: CLINIC | Age: 49
Discharge: HOME | End: 2024-05-10
Attending: EMERGENCY MEDICINE | Admitting: EMERGENCY MEDICINE
Payer: COMMERCIAL

## 2024-05-10 VITALS
BODY MASS INDEX: 19.26 KG/M2 | TEMPERATURE: 99.8 F | HEART RATE: 59 BPM | HEIGHT: 69 IN | SYSTOLIC BLOOD PRESSURE: 99 MMHG | RESPIRATION RATE: 24 BRPM | DIASTOLIC BLOOD PRESSURE: 67 MMHG | WEIGHT: 130 LBS | OXYGEN SATURATION: 94 %

## 2024-05-10 DIAGNOSIS — F32.A DEPRESSION, UNSPECIFIED DEPRESSION TYPE: ICD-10-CM

## 2024-05-10 DIAGNOSIS — T50.902A INTENTIONAL DRUG OVERDOSE, INITIAL ENCOUNTER (H): ICD-10-CM

## 2024-05-10 DIAGNOSIS — R45.851 SUICIDAL IDEATION: ICD-10-CM

## 2024-05-10 PROBLEM — F33.2 SEVERE RECURRENT MAJOR DEPRESSION WITHOUT PSYCHOTIC FEATURES (H): Status: ACTIVE | Noted: 2024-05-10

## 2024-05-10 PROBLEM — F41.1 GAD (GENERALIZED ANXIETY DISORDER): Status: ACTIVE | Noted: 2024-05-10

## 2024-05-10 LAB
ALBUMIN SERPL BCG-MCNC: 4.1 G/DL (ref 3.5–5.2)
ALP SERPL-CCNC: 112 U/L (ref 40–150)
ALT SERPL W P-5'-P-CCNC: 14 U/L (ref 0–70)
ANION GAP SERPL CALCULATED.3IONS-SCNC: 9 MMOL/L (ref 7–15)
APAP SERPL-MCNC: <5 UG/ML (ref 10–30)
AST SERPL W P-5'-P-CCNC: 19 U/L (ref 0–45)
ATRIAL RATE - MUSE: 58 BPM
BASOPHILS # BLD AUTO: 0.1 10E3/UL (ref 0–0.2)
BASOPHILS NFR BLD AUTO: 1 %
BILIRUB SERPL-MCNC: 0.4 MG/DL
BUN SERPL-MCNC: 16.1 MG/DL (ref 6–20)
CALCIUM SERPL-MCNC: 9.3 MG/DL (ref 8.6–10)
CHLORIDE SERPL-SCNC: 102 MMOL/L (ref 98–107)
CREAT SERPL-MCNC: 0.84 MG/DL (ref 0.67–1.17)
DEPRECATED HCO3 PLAS-SCNC: 27 MMOL/L (ref 22–29)
DIASTOLIC BLOOD PRESSURE - MUSE: NORMAL MMHG
EGFRCR SERPLBLD CKD-EPI 2021: >90 ML/MIN/1.73M2
EOSINOPHIL # BLD AUTO: 0.4 10E3/UL (ref 0–0.7)
EOSINOPHIL NFR BLD AUTO: 6 %
ERYTHROCYTE [DISTWIDTH] IN BLOOD BY AUTOMATED COUNT: 11.9 % (ref 10–15)
GLUCOSE SERPL-MCNC: 88 MG/DL (ref 70–99)
HCT VFR BLD AUTO: 45.4 % (ref 40–53)
HGB BLD-MCNC: 15.7 G/DL (ref 13.3–17.7)
IMM GRANULOCYTES # BLD: 0 10E3/UL
IMM GRANULOCYTES NFR BLD: 0 %
INTERPRETATION ECG - MUSE: NORMAL
LYMPHOCYTES # BLD AUTO: 2.1 10E3/UL (ref 0.8–5.3)
LYMPHOCYTES NFR BLD AUTO: 31 %
MCH RBC QN AUTO: 32.1 PG (ref 26.5–33)
MCHC RBC AUTO-ENTMCNC: 34.6 G/DL (ref 31.5–36.5)
MCV RBC AUTO: 93 FL (ref 78–100)
MONOCYTES # BLD AUTO: 0.6 10E3/UL (ref 0–1.3)
MONOCYTES NFR BLD AUTO: 9 %
NEUTROPHILS # BLD AUTO: 3.6 10E3/UL (ref 1.6–8.3)
NEUTROPHILS NFR BLD AUTO: 53 %
NRBC # BLD AUTO: 0 10E3/UL
NRBC BLD AUTO-RTO: 0 /100
P AXIS - MUSE: 27 DEGREES
PLATELET # BLD AUTO: 233 10E3/UL (ref 150–450)
POTASSIUM SERPL-SCNC: 3.8 MMOL/L (ref 3.4–5.3)
PR INTERVAL - MUSE: 140 MS
PROT SERPL-MCNC: 7.6 G/DL (ref 6.4–8.3)
QRS DURATION - MUSE: 90 MS
QT - MUSE: 410 MS
QTC - MUSE: 402 MS
R AXIS - MUSE: 59 DEGREES
RBC # BLD AUTO: 4.89 10E6/UL (ref 4.4–5.9)
SALICYLATES SERPL-MCNC: <0.3 MG/DL
SODIUM SERPL-SCNC: 138 MMOL/L (ref 135–145)
SYSTOLIC BLOOD PRESSURE - MUSE: NORMAL MMHG
T AXIS - MUSE: 77 DEGREES
VENTRICULAR RATE- MUSE: 58 BPM
WBC # BLD AUTO: 6.7 10E3/UL (ref 4–11)

## 2024-05-10 PROCEDURE — 80143 DRUG ASSAY ACETAMINOPHEN: CPT | Performed by: EMERGENCY MEDICINE

## 2024-05-10 PROCEDURE — 99284 EMERGENCY DEPT VISIT MOD MDM: CPT | Mod: 25

## 2024-05-10 PROCEDURE — 80179 DRUG ASSAY SALICYLATE: CPT | Performed by: EMERGENCY MEDICINE

## 2024-05-10 PROCEDURE — 93005 ELECTROCARDIOGRAM TRACING: CPT

## 2024-05-10 PROCEDURE — 96360 HYDRATION IV INFUSION INIT: CPT

## 2024-05-10 PROCEDURE — 36415 COLL VENOUS BLD VENIPUNCTURE: CPT | Performed by: EMERGENCY MEDICINE

## 2024-05-10 PROCEDURE — 82040 ASSAY OF SERUM ALBUMIN: CPT | Performed by: EMERGENCY MEDICINE

## 2024-05-10 PROCEDURE — 99285 EMERGENCY DEPT VISIT HI MDM: CPT | Mod: 25

## 2024-05-10 PROCEDURE — 258N000003 HC RX IP 258 OP 636: Performed by: EMERGENCY MEDICINE

## 2024-05-10 PROCEDURE — 85025 COMPLETE CBC W/AUTO DIFF WBC: CPT | Performed by: EMERGENCY MEDICINE

## 2024-05-10 RX ADMIN — SODIUM CHLORIDE 1000 ML: 9 INJECTION, SOLUTION INTRAVENOUS at 14:33

## 2024-05-10 ASSESSMENT — ACTIVITIES OF DAILY LIVING (ADL)
ADLS_ACUITY_SCORE: 35

## 2024-05-10 ASSESSMENT — COLUMBIA-SUICIDE SEVERITY RATING SCALE - C-SSRS
1. IN THE PAST MONTH, HAVE YOU WISHED YOU WERE DEAD OR WISHED YOU COULD GO TO SLEEP AND NOT WAKE UP?: YES
3. HAVE YOU BEEN THINKING ABOUT HOW YOU MIGHT KILL YOURSELF?: YES
5. HAVE YOU STARTED TO WORK OUT OR WORKED OUT THE DETAILS OF HOW TO KILL YOURSELF? DO YOU INTEND TO CARRY OUT THIS PLAN?: YES
4. HAVE YOU HAD THESE THOUGHTS AND HAD SOME INTENTION OF ACTING ON THEM?: NO
6. HAVE YOU EVER DONE ANYTHING, STARTED TO DO ANYTHING, OR PREPARED TO DO ANYTHING TO END YOUR LIFE?: YES
2. HAVE YOU ACTUALLY HAD ANY THOUGHTS OF KILLING YOURSELF IN THE PAST MONTH?: YES

## 2024-05-10 NOTE — CONSULTS
Diagnostic Evaluation Consultation  Crisis Assessment    Patient Name: Mingo Melgoza  Age:  49 year old  Legal Sex: male  Gender Identity: male  Pronouns:   Race: White  Ethnicity: Not  or   Language: English      Patient was assessed: Virtual: [place] [employer] Crisis Assessment Start Time: 1703 Crisis Assessment Stop Time: 1735  Patient location: [facility] EMERGENCY DEPT                                 Referral Data and Chief Complaint  Mingo Melgoza presents to the ED by  self, per community partner(s). Patient is presenting to the ED for the following concerns: Significant behavioral change, Recent loss, Anxiety, Depression, Suicidal ideation, Suicide attempt, Worsening psychosocial stress.   Factors that make the mental health crisis life threatening or complex are:  Pt became suicidal last night and made suicide attempt by taking 10 to 12 tablets each of metoprolol, amlodipine, and rosuvastatin.  Pt reported he called the crisis line, then Poison Control who advised him to go to the ER but he did not follow through.  Pt decided to bring himself to the ER today to get help.  Pt also being homeless, and limited family support..      Informed Consent and Assessment Methods  Explained the crisis assessment process, including applicable information disclosures and limits to confidentiality, assessed understanding of the process, and obtained consent to proceed with the assessment.  Assessment methods included conducting a formal interview with patient, review of medical records, collaboration with medical staff, and obtaining relevant collateral information from family and community providers when available.  : done     Patient response to interventions: eager to participate, acceptance expressed, verbalizes understanding  Coping skills were attempted to reduce the crisis:  watching TV, listening to music, taking walks, playing golf, games on phone.     History of the Crisis   Pt is a 49 year  "old White male with history of depression, anxiety, and suicidal ideations.  Pt remarked, \"Yesterday, I made decision to take some pills, I am having financial and family issues for a long time.\" as his reason for visiting the ER today.  Pt became suicidal last night and made suicide attempt by taking 10 to 12 tablets each of metoprolol, amlodipine, and rosuvastatin. Pt reported he called the crisis line, then Poison Control who advised him to go to the ER but he did not follow through. Pt decided to bring himself to the ER today to get help.  Pt currently denied having suicidal ideations, intent and plan.  Pt denied having homicidal ideations, history of SIB and access to firearms.  Pt reported history of previous suicide attempt in January, 2024 by overdosing pills.  Pt endorsed increased depression, isolation, cry, worry, racing thoughts and anxiety.  Pt reported having poor sleep and appetite.  Pt denied having acute psychosis and cecilia.  Pt reported he was staying at his sister's place but she kicked him out last night as he became homelss.  Pt also shared he has gambling addiction as he recently lost $4,000 in gambling as stressor.  Pt reported lack of family support and not being able to see his two children for many years as triggers leading to his current mental health crisis.    Brief Psychosocial History  Family:  , Children yes  Support System:     Employment Status:  unemployed  Source of Income:  unable to assess  Financial Environmental Concerns:  unable to afford rent/mortgage, unemployed  Current Hobbies:  arts/crafts, music, social media/computer activities, television/movies/videos, games, exercise/fitness  Barriers in Personal Life:  behavioral concerns, emotional concerns, mental health concerns    Significant Clinical History  Current Anxiety Symptoms:  anxious, racing thoughts, excessive worry  Current Depression/Trauma:  apathy, crying or feels like crying, helplessness, hopelessness, " sadness, thoughts of death/suicide, impaired decision making, withdrawl/isolation  Current Somatic Symptoms:  excessive worry, anxious, racing thoughts  Current Psychosis/Thought Disturbance:  impulsive, high risk behavior  Current Eating Symptoms:  loss of appetite  Chemical Use History:  Alcohol: None  Benzodiazepines: None  Opiates: None  Cocaine: None  Marijuana: None  Other Use: None   Past diagnosis:  Anxiety Disorder, Depression, Suicide attempt(s)  Family history:  No known history of mental health or chemical health concerns  Past treatment:  Psychiatric Medication Management  Details of most recent treatment:  Pt has no recent treatment.  Pt has no history of CD Treatment and psychiatric hospitalization.  Pt reported he has a new outpatient psychiatry appointment scheduled for July but did not have therapy service.  Other relevant history:  Pt shared her parents passed away.  Pt reported having 2 brothers and one sister.  Pt reported he was  with 2 children.  Pt reported he was homeless and unemployed.  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  Pt reported history of probation for theft charge as his legal issues.  Pt denied history of being abused.       Collateral Information  Is there collateral information: No (Pt reported he has no family support and declined to collateral information.)     Collateral information name, relationship, phone number:       What happened today:       What is different about patient's functioning:       Concern about alcohol/drug use:      What do you think the patient needs:      Has patient made comments about wanting to kill themselves/others:      If d/c is recommended, can they take part in safety/aftercare planning:       Additional collateral information:        Risk Assessment  [place] Suicide Severity Rating Scale Full Clinical Version:  Suicidal Ideation  Q1 Wish to be Dead (Lifetime): Yes  Q2 Non-Specific Active Suicidal  Thoughts (Lifetime): Yes  3. Active Suicidal Ideation with any Methods (Not Plan) Without Intent to Act (Lifetime): Yes  Q4 Active Suicidal Ideation with Some Intent to Act, Without Specific Plan (Lifetime): Yes  Q5 Active Suicidal Ideation with Specific Plan and Intent (Lifetime): Yes  Q6 Suicide Behavior (Lifetime): no     Suicidal Behavior (Lifetime)  Actual Attempt (Lifetime): Yes  Total Number of Actual Attempts (Lifetime): 2  Actual Attempt Description (Lifetime): Pt reported overdosing on pills last night and called the crisis line.  Pt also called the Poison Control who advised him to the ER but he did not follow through.  Pt reported history of previous suicide attempt about 3 months ago by overdosing pills.  Has subject engaged in non-suicidal self-injurious behavior? (Lifetime): No  Interrupted Attempts (Lifetime): No  Aborted or Self-Interrupted Attempt (Lifetime): Yes  Total Number of Aborted or Self-Interrupted Attempts (Lifetime): 1  Aborted or Self-Interrupted Attempt Description (Lifetime): Pt reported overdosing on pills last night and called the crisis line. Pt also called the Poison Control who advised him to the ER but he did not follow through. Pt reported history of previous suicide attempt about 3 months ago by overdosing pills.  Preparatory Acts or Behavior (Lifetime): No    [place] Suicide Severity Rating Scale Recent:   Suicidal Ideation (Recent)  Q1 Wished to be Dead (Past Month): yes  Q2 Suicidal Thoughts (Past Month): yes  Q3 Suicidal Thought Method: yes  Q4 Suicidal Intent without Specific Plan: yes  Q5 Suicide Intent with Specific Plan: yes  Within the Past 3 Months?: yes  Level of Risk per Screen: high risk  Intensity of Ideation (Recent)  Most Severe Ideation Rating (Past 1 Month): 5  Frequency (Past 1 Month): Once a week  Duration (Past 1 Month): 1-4 hours/a lot of time  Suicidal Behavior (Recent)  Actual Attempt (Past 3 Months): Yes  Total Number of Actual Attempts (Past 3  Months): 2  Actual Attempt Description (Past 3 Months): Pt reported overdosing on pills last night and called the crisis line. Pt also called the Poison Control who advised him to the ER but he did not follow through. Pt reported history of previous suicide attempt about 3 months ago by overdosing pills.  Has subject engaged in non-suicidal self-injurious behavior? (Past 3 Months): No  Interrupted Attempts (Past 3 Months): No  Aborted or Self-Interrupted Attempt (Past 3 Months): Yes  Total Number of Aborted or Self-Interrupted Attempts (Past 3 Months): 1  Aborted or Self-Interrupted Attempt Description (Past 3 Months): Pt reported overdosing on pills last night and called the crisis line. Pt also called the Poison Control who advised him to the ER but he did not follow through. Pt reported history of previous suicide attempt about 3 months ago by overdosing pills.  Preparatory Acts or Behavior (Past 3 Months): No    Environmental or Psychosocial Events: challenging interpersonal relationships, loss of a relationship due to divorce/separation, work or task failure, helplessness/hopelessness, unstable housing, homelessness, unemployment/underemployment, recent life events (see comment), neither working nor attending school, other life stressors, social isolation, impulsivity/recklessness, excessive debt, poor finances  Protective Factors: Protective Factors: help seeking, constructive use of leisure time, enjoyable activities, resilience, reality testing ability    Does the patient have thoughts of harming others? Feels Like Hurting Others: no  Previous Attempt to Hurt Others: no  Current presentation:  (Pt was calm, alert, oriented, engagedand cooperative.)  Is the patient engaging in sexually inappropriate behavior?: no    Is the patient engaging in sexually inappropriate behavior?  no        Mental Status Exam   Affect: Constricted  Appearance: Appropriate, Disheveled  Attention Span/Concentration: Attentive  Eye  Contact: Variable, Engaged    Fund of Knowledge: Appropriate   Language /Speech Content: Fluent  Language /Speech Volume: Normal  Language /Speech Rate/Productions: Normal  Recent Memory: Variable  Remote Memory: Variable  Mood: Anxious, Apathetic, Depressed, Sad  Orientation to Person: Yes   Orientation to Place: Yes  Orientation to Time of Day: Yes  Orientation to Date: Yes     Situation (Do they understand why they are here?): Yes  Psychomotor Behavior: Normal  Thought Content: Clear  Thought Form: Intact, Tangential     Mini-Cog Assessment  Number of Words Recalled:    Clock-Drawing Test:     Three Item Recall:    Mini-Cog Total Score:       Medication  Psychotropic medications:   Medication Orders - Psychiatric (From admission, onward)      None             Current Care Team  Patient Care Team:  No Ref-Primary, Physician as PCP - General    Diagnosis  Patient Active Problem List   Diagnosis Code    Percutaneous transluminal coronary angioplasty status Z98.61    Troponin level elevated R79.89    Chest pain, unspecified type R07.9    Severe recurrent major depression without psychotic features (H) F33.2    NORBERT (generalized anxiety disorder) F41.1       Primary Problem This Admission  Active Hospital Problems    Severe recurrent major depression without psychotic features (H)      NORBERT (generalized anxiety disorder)        Clinical Summary and Substantiation of Recommendations   Pt presenting in the ER today due to worsening of depression, anxiety, suicidal ideations and recent suicide attempt.  Pt became suicidal last night and made suicide attempt by taking 10 to 12 tablets each of metoprolol, amlodipine, and rosuvastatin. Pt reported he called the crisis line, then Poison Control who advised him to go to the ER but he did not follow through. Pt decided to bring himself to the ER today to get help. Pt currently denied having suicidal ideations, intent and plan. Pt denied having homicidal ideations, history of SIB  and access to firearms. Pt reported history of previous suicide attempt in January, 2024 by overdosing pills. Pt endorsed increased depression, isolation, cry, worry, racing thoughts and anxiety. Pt reported having poor sleep and appetite. Pt denied having acute psychosis and cecilia. Pt reported he was staying at his sister's place but she kicked him out last night as he became homelss. Pt also shared he has gambling addiction as he recently lost $4,000 in gambling as stressor. Pt reported lack of family support and not being able to see his two children for many years as triggers leading to his current mental health crisis.  Pt was able to engage in his DEC safety plan as he felt safe to be discharged.  Pt was not imminent danger to himself or to others.  Pt was not acutely suicidal at this time.  Pt was appropriate for outpatient mental health services.                          Patient coping skills attempted to reduce the crisis:  watching TV, listening to music, taking walks, playing golf, games on phone.    Disposition  Recommended disposition: Individual Therapy, Medication Management        Reviewed case and recommendations with attending provider. Attending Name: Dr. Hernandez.       Attending concurs with disposition: yes       Patient and/or validated legal guardian concurs with disposition:   yes       Final disposition:  discharge    Legal status on admission:      Assessment Details   Total duration spent with the patient: 32 min     CPT code(s) utilized: 16597 - Psychotherapy for Crisis - 60 (30-74*) min    Ez Chapman [facility], Psychotherapist  DEC - Triage & Transition Services  Callback: [phone]

## 2024-05-10 NOTE — ED NOTES
RN ED Mental Health Handoff Note    Voluntary    Does patient require 1:1? Yes    Hold and rights been given and documented for patient: No    Is the patient in BH scrubs? No -not on a hold    Has the patient been searched? No -not on a hold    Is the 15 minute observation tool up to date? Yes    Was patient issued a welcome folder? Yes    Room check completed this shift: Yes    PSS3 and [place] Assessment/Reassessment this shift:    C-SSRS ([place])      Date and Time Q1 Wished to be Dead (Past Month) Q2 Suicidal Thoughts (Past Month) Q3 Suicidal Thought Method Q4 Suicidal Intent without Specific Plan Q5 Suicide Intent with Specific Plan Q6 Suicide Behavior (Lifetime) Within the Past 3 Months? Level of Risk per Screen Level of Risk per Screen User   05/10/24 1249 1-->yes 1-->yes 1-->yes 0-->no 1-->yes 1-->yes 1-->yes -- high risk MRN            Behavioral status of patient: Green    Code 21 called this shift? No    Use of restraints/seclusion this shift? No    Most recent vital signs:  Temp: 99.8  F (37.7  C) Temp src: Temporal BP: 93/63 Pulse: 57   Resp: 23 SpO2: 93 % O2 Device: None (Room air)      Medications:  Scheduled medication compliance? Yes    PRN Meds administered this shift? No    Medications   sodium chloride 0.9% BOLUS 1,000 mL (1,000 mLs Intravenous $New Bag 5/10/24 8666)         ADLs    Meal Provided this shift? Yes    Hygiene items provided? Yes    ADLs completed? Yes    Date of last shower: Unknown    Any significant events this shift? No    Any information that would be helpful in caring for this patient?  NA    Family present/updated? No, patient     Location of patient's belongings: Patient has his own belongings, nothing concerning.     Critical Care Minutes:  Does the patient need critical care minutes documented? No

## 2024-05-10 NOTE — ED PROVIDER NOTES
Emergency Department Note      History of Present Illness     Chief Complaint  Drug overdose, suicidal    HPI  Mingo Melgoza is a 49 year old male with a long-term history of depression who presents emergency department for evaluation of ongoing depression.  He notes last night he impulsively took 10 to 12 tablets each of metoprolol, amlodipine, and rosuvastatin.  He notes he called the crisis line afterward and then poison control was told to be evaluated last night however he did not feel as though he could drive and did not want to call 911.  He notes last night he felt flushed and had a headache but denies any other symptoms.  He notes this morning he has not had any physical concerns.  He continues to feel depressed but does not have a suicidal plan at this time.  He notes he of his own volition came here for help with his underlying mental health condition.  He continues to deny any dizziness, shortness of breath or ongoing headache or flushed feeling.    Independent Historian  None    Review of External Notes  Recent hospitalization at [facility] reviewed from 3/7/2024 for a NSTEMI.    Past Medical History   Medical History and Problem List  Past Medical History:   Diagnosis Date    Major depression     Nephrolithiasis    CAD s/p PCI    Medications  amLODIPine (NORVASC) 2.5 MG tablet  aspirin 81 MG EC tablet  metoprolol succinate ER (TOPROL XL) 25 MG 24 hr tablet  nitroGLYcerin (NITROSTAT) 0.4 MG sublingual tablet  rosuvastatin (CRESTOR) 20 MG tablet  sertraline (ZOLOFT) 50 MG tablet  ticagrelor (BRILINTA) 90 MG tablet        Surgical History   Past Surgical History:   Procedure Laterality Date    CV CORONARY ANGIOGRAM N/A 1/23/2024    Procedure: Coronary Angiogram;  Surgeon: Scott Harrison MD;  Location: [facility] CARDIAC CATH LAB    CV CORONARY ANGIOGRAM N/A 3/6/2024    Procedure: Coronary Angiogram;  Surgeon: Wade Rubalcava MD;  Location: [facility] CARDIAC CATH LAB    CV PCI N/A  "1/23/2024    Procedure: Percutaneous Coronary Intervention;  Surgeon: Scott Harrison MD;  Location: [facility] CARDIAC CATH LAB     Physical Exam   Patient Vitals for the past 24 hrs:   BP Temp Temp src Pulse Resp SpO2 Height Weight   05/10/24 1515 93/63 -- -- 57 23 93 % -- --   05/10/24 1500 92/58 -- -- 60 25 97 % -- --   05/10/24 1453 -- -- -- -- -- -- 1.753 m (5' 9\") 59 kg (130 lb)   05/10/24 1452 98/62 -- -- 57 -- 97 % -- --   05/10/24 1430 100/69 -- -- 54 16 95 % -- --   05/10/24 1420 90/62 -- -- -- -- -- -- --   05/10/24 1417 -- -- -- 53 23 95 % -- --   05/10/24 1416 -- -- -- 54 25 94 % -- --   05/10/24 1415 90/62 -- -- 54 16 94 % -- --   05/10/24 1414 -- -- -- 59 11 94 % -- --   05/10/24 1413 -- -- -- 54 23 96 % -- --   05/10/24 1412 93/63 -- -- 53 18 94 % -- --   05/10/24 1410 93/62 -- -- 54 18 94 % -- --   05/10/24 1400 93/63 -- -- 58 18 95 % -- --   05/10/24 1330 100/69 -- -- 59 18 97 % -- --   05/10/24 1305 100/69 -- -- 58 18 97 % -- --   05/10/24 1248 110/77 99.8  F (37.7  C) Temporal 66 18 97 % -- --     Physical Exam  General: Adult in on the stretcher  Eyes: PERRL, Conjunctive within normal limits  ENT: Moist mucous membranes, oropharynx clear.   CV: Normal S1S2, no murmur, rub or gallop.  Bradycardic, regular.  Resp: Clear to auscultation bilaterally, no wheezes, rales or rhonchi. Normal respiratory effort.  GI: Abdomen is soft, nontender and nondistended.  MSK: No edema.  Normal active range of motion.  Skin: Warm and dry. No rashes or lesions or ecchymoses on visible skin.  Neuro: Alert and oriented. Responds appropriately to all questions and commands. No focal findings appreciated. Normal muscle tone.  Psych: Depressed mood.  Flat affect.  Cooperative  Diagnostics   Lab Results   Labs Ordered and Resulted from Time of ED Arrival to Time of ED Departure   ACETAMINOPHEN LEVEL - Abnormal       Result Value    Acetaminophen <5.0 (*)    COMPREHENSIVE METABOLIC PANEL - Normal    Sodium 138      " Potassium 3.8      Carbon Dioxide (CO2) 27      Anion Gap 9      Urea Nitrogen 16.1      Creatinine 0.84      GFR Estimate >90      Calcium 9.3      Chloride 102      Glucose 88      Alkaline Phosphatase 112      AST 19      ALT 14      Protein Total 7.6      Albumin 4.1      Bilirubin Total 0.4     SALICYLATE LEVEL - Normal    Salicylate <0.3     CBC WITH PLATELETS AND DIFFERENTIAL    WBC Count 6.7      RBC Count 4.89      Hemoglobin 15.7      Hematocrit 45.4      MCV 93      MCH 32.1      MCHC 34.6      RDW 11.9      Platelet Count 233      % Neutrophils 53      % Lymphocytes 31      % Monocytes 9      % Eosinophils 6      % Basophils 1      % Immature Granulocytes 0      NRBCs per 100 WBC 0      Absolute Neutrophils 3.6      Absolute Lymphocytes 2.1      Absolute Monocytes 0.6      Absolute Eosinophils 0.4      Absolute Basophils 0.1      Absolute Immature Granulocytes 0.0      Absolute NRBCs 0.0           EKG     ECG results from 05/10/24   EKG 12-lead, tracing only     Value    Systolic Blood Pressure     Diastolic Blood Pressure     Ventricular Rate 58    Atrial Rate 58    LA Interval 140    QRS Duration 90        QTc 402    P Axis 27    R AXIS 59    T Axis 77    Interpretation ECG      Sinus bradycardia  Otherwise normal ECG  When compared with ECG of 06-MAR-2024 04:12,  No significant change was found  Confirmed by - EMERGENCY ROOM, PHYSICIAN ([id number]),  ROBINSON EM ([id number]) on 5/10/2024 1:59:29 PM          ED Course    Medications Administered  Medications   sodium chloride 0.9% BOLUS 1,000 mL (1,000 mLs Intravenous $New Bag 5/10/24 1433)     Discussion of Management  I placed a DEC consult.  I consulted with poison control who felt the patient most likely after the peak effects of the medication that he reportedly took.    Social Determinants of Health adding to complexity of care  None    ED Course  Past medical records, nursing notes, and vitals reviewed.  I performed an exam of the patient  and obtained history, as documented above.   I reassessed the patient.  He is stable.  Denies any symptoms.  Vital signs are stable.    Medical Decision Making / Diagnosis   MARION Melgoza is a 49 year old male with a history of depression and heart disease on amlodipine and metoprolol as well as simvastatin who presents to the emergency department with drug overdose due to suicidal thoughts in the setting of depression and impulsive behavior.  He notes he has struggled with depression and compulsive and impulsive behavior for years.  He seems to regret his actions last night but still struggling with significant depression and needing help.  DEC was consulted and will assess the patient.  He remained cooperative and calm here.  With regards to the drug overdose, main issues of concern would be hypotension and bradycardia.  He was mildly bradycardic but not hypotensive.  He was asymptomatic.  He is likely after the peak effect of the medications he ingested as it happened last night.  Poison control was involved in his care and did not have any further recommendations.  He had no hemodynamic decompensation over my shift.      Disposition: Care of the patient was transferred to my colleague Dr. Hernandez pending DEC assessment.     ICD-10 Codes:    ICD-10-CM    1. Intentional drug overdose, initial encounter (H)  T50.902A       2. Suicidal ideation  R45.851       3. Depression, unspecified depression type  F32.A            Jessica Eugene MD    [employer]        Jessica Eugene MD  05/10/24 8192

## 2024-05-10 NOTE — ED PROVIDER NOTES
I assumed care from Dr Eugene. Patient was assessed by DEC, who felt he is appropriate for outpatient management. He did overdose and had suicidal ideation last night but is not suicidal presently and does not meet hold or inpatient criteria. Dr Eugene felt the patient would be appropriate for outpatient management if DEC agreed. The patient was discharged.     Michael Hernandez MD  05/10/24 1164

## 2024-05-10 NOTE — ED TRIAGE NOTES
Pt arrives with c/o suicide attempt last night. Pt reports taking a mixture of heart medications last night, a total of 12 pills each of 3 different meds. Pt denies any drug or alcohol use.

## 2024-05-10 NOTE — DISCHARGE INSTRUCTIONS
Writer encourage Pt to take his medications as prescribed and keep all of scheduled appointments with his outpatient service providers.  Writer recommended Pt to engage in new outpatient psychiatry for medication management and individual therapy service to improve coping skills.  Writer also recommended Pt to engage in gambling support group to address his gambling addiction.  DEC coordinator will contact Pt within next 1 or 2 business days to ensure coordination of care and provide assistance with appointments.      Pt has a new virtual outpatient psychiatry appointment scheduled with a following service provider:  Date: Saturday, 5/11/2024  Time: 10:00 am - 11:00 am  Provider: Kimberly Love  MSN  CNP,RN  Location: [address] [address]  Phone: [phone]  Type: Telepsychiatry    Patient Instructions  1. All intake paperwork will be completed electronically. 2. Appointment will be confirmed after all intake paperwork is completed. 3. Book an appointment at [email or web address] or [email or web address]. Location is close to the main road and bus route. Free parking Bring Insurance and discharge paperwork The office is on the 3rd floor Arrive 10-15 minutes for insurance verification Telehealth- - No driving. You cannot be on public transport e.g city bus    Pt has a new virtual outpatient therapy appointment scheduled with a following service provider:  Date: Monday, 5/13/2024  Time: 10:30 am - 11:23 am  Provider: Mingo Isaac MA  [facility]  Location: [facility], [address] [place] [address]  Phone: [phone]  Type: Teletherapy    Below is a list of FREE Mental Health Options in the [facility] Area:    [facility] ([facility]  Serves those in emotional crisis with 24-hour, seven-day-a-week crisis counseling, assessment, referral, and medication management.   Suicidal: [phone] Consultation:  [phone]  [facility] 24/7 Crisis Intervention Center     Walk-in Counseling Center  [phone]  Serves those in need of free outpatient mental health care  Hours: Mon, Wed, Fri 1-3pm; Mon-Thurs 6:30-8:30pm    [facility]  [address] [place] [address]  [phone]     [facility], start with Adult Shelter Connect Overnight Shelter: [phone]  *Phone lines are closed between 5:30-7:30 pm    [address] (enter on the [address] side near the [address] corner)  Walk-in Hours: 9 am - 5:30 pm Monday-Friday and 1 pm - 5:30 pm Saturday and Sunday    [facility]  [phone]  165 [facility]  [phone]  2210 [facility]  [phone]  221 [facility]  [phone]  1010 [facility]  [phone]  [address] [place] [address]    To start making a plan for a more long-term solution, contact the Coordinated Entry Homeless Assistance Program:    Coordinated Entry Homeless Assistance  [facility]  [address] [place] [address]  [phone]  Open Wednesdays and Thursdays 8 am-2 pm  The Coordinated Entry System is the [facility]'s approach to organizing and providing housing services for people experiencing homelessness in [facility].  Because housing resources are limited, this process is designed to ensure that individuals and families with the highest vulnerability, service needs, and length of homelessness receive top priority in housing placement.    Assessment changes due to COVID-19 virus    [facility] Human Services family assessors will now be conducting assessments by phone. If you are working with a family staying in a place other than a [facility]-funded shelter and is eligible for Coordinated Entry, continue to contact [facility]  [facility] at [phone] to set up an assessment.    For single adults, [facility] will be suspending drop-in hours at [facility]. To have a Coordinated Entry assessment completed, call the [facility]' certified assessors: Reza at [phone] or Ivette at [phone] directly to set up a time to meet    Call 211 at any time on any day for questions about services and resources available to you.      Family Shelters    [facility] Shelter Team  [phone]  525 [facility], Floors 5 & 6              Youth, families & disabled adults    Hours: Mon-Fri 8:00 am-4:30 pm.  After-Hours Shelter Team  211      Drop-Ins    [facility]  [phone]  [address] [place] [address] ([facility])              Showers/Laundry (8-11am)              Health Care              Employment Services              Breakfast (7-8 am)              Lunch (11:30am-12:30pm)    Hours: Mon-Sat: Summer 7am-3pm; Winter 7am-4pm.      [place] [phone]  [address] [place] [address]  Hours: Mon, Wed and Fri 9:00-11:30 am      Clothing    [facility]  [phone]  [address]  Hours: M-Th 10-11:30am & 1:30-3:30pm; Sat/Sun 9:30-10:30 am      Free Meals & Showers    [facility] [employer]  [phone]  [address] [place] [address]  Dinner: Mon-Fri 5:30-6:30pm (No showers)    [place] [facility]  [phone]  Meals ([address]): Women & Children M-F 8:30-9am; Everyone: M-F 12-1pm; Sat/Sun, 10:30-11:30 am  Showers ([address]): Mon-Fri 9-10 am    [facility] [employer] [place] [facility]  [phone]  [address]  Dinner: Thurs - Mon 6 pm  Showers: Daily 8 - 4:30 pm    Health Care    [facility]  [phone]  Clinics are in [address] [place] [address] shelters/drop-in centers.  Hours: Mon-Fri at varying sites. Call for sites/times. No insurance or appts required to receive care.  Clinic sites: [facility], [facility], [facility], [facility],  [facility], [facility], [facility], [facility], [facility], [facility], [facility]     [facility] HOMELESSNESS RESOURCES       For shelter tonight, start with Adult Shelter Connect Overnight Shelter: [phone]  *Phone lines are closed between 5:30-7:30 pm    [place] [address] (enter on the [address] side near the [address] corner)  Walk-in Hours: 9 am - 5:30 pm Monday-Friday and 1 pm - 5:30 pm Saturday and Sunday    [employer] [facility]  [phone]  165 [facility]  [phone]  2210 [facility]. [facility]  [phone]  2215 [facility]  [phone]  1011 [facility]  [phone]  [address] [place] [address]    To start making a plan for a more long-term solution, contact the Coordinated Entry Homeless Assistance Program:    Coordinated Entry Homeless Assistance  [facility]  [address] [place] [address]  [phone]  Open Wednesdays and Thursdays 8 am-2 pm  The Coordinated Entry System is the [facility]'s approach to organizing and providing housing services for people experiencing homelessness in [facility].  Because housing resources are limited, this process is designed to ensure that individuals and families with the highest vulnerability, service needs, and length of homelessness receive top priority in housing placement.    Assessment changes due to COVID-19 virus    [facility] family assessors will now be conducting assessments by phone. If you are working with a family staying in a place other than a [facility] and is eligible for Coordinated Entry, continue to contact [facility]  at [phone] to set up an assessment.    For single adults, [facility] Services will be suspending drop-in hours at [facility]. To have a Coordinated Entry assessment completed, call the [facility] Services' certified assessors: Reza at [phone] or Ivette at [phone] directly to set up a time to meet    Call 211 at any time on any day for questions about services and resources available to you.      Family Shelters    [facility] Shelter Team  [phone]  525 [facility], Floors 5 & 6              Youth, families & disabled adults    Hours: Mon-Fri 8:00 am-4:30 pm.  After-Hours Shelter Team  211         Drop-Ins    [facility]  [phone]  [address] ([facility])              Showers/Laundry (8-11am)              Health Care              Employment Services              Breakfast (7-8 am)              Lunch (11:30am-12:30pm)    Hours: Mon-Sat: Summer 7am-3pm; Winter 7am-4pm.         [facility] [place] [phone]  [address] [place] [address]  Hours: Mon, Wed and Fri 9:00-11:30 am      Clothing    [facility]  [phone]  [address] [place] [address]  Hours: M-Th 10-11:30am & 1:30-3:30pm; Sat/Sun 9:30-10:30 am         Free Meals & Showers    [facility]  [phone]  [address] [place] [address]  Dinner: Mon-Fri 5:30-6:30pm (No showers)    [place] [facility]  [phone]  Meals ([address]): Women & Children M-F 8:30-9am; Everyone: M-F 12-1pm; Sat/Sun, 10:30-11:30 am  Showers ([address] [place] [address]): Mon-Fri 9-10 am    [employer]  [phone]  [address]  Dinner: Thurs - Mon 6 pm  Showers: Daily 8 - 4:30 pm    Health Care    [facility] the Homeless  [phone]  Clinics are in [address] [place] shelters/drop-in centers.  Hours: Mon-Fri at varying sites. Call for sites/times. No insurance or appts required to receive care.  Clinic sites: [facility] [place], [facility], [facility], [facility], [facility], [facility], [facility], [facility], [facility], [facility],  [facility]      Domestic/Sexual Violence Survivors    [facility]  [phone]  [address] [place] [address]            Singles women, families, survivors of prostitution & domestic abuse    Rape & Sexual Violence Hotline  060-572-NPGH    [facility] Toll-Free 24h crisis line  [phone]     [facility] Crisis Line  [phone]      Rule 25 Chemical Health Assessments    [facility]  [phone] [address] [facility]  Hours: Mon-Fri, 8 am-2:30 pm (first come, first served)    [facility]  [phone]  264[address] [facility]  Walk-in: M-F, 7am-4pm (First come, first served or by appt)      Mental Health Care    [facility] ([facility])  Suicidal: [phone] Consultation: [phone]  701 [facility], 24/7 Crisis Intervention Center     Walk-in Counseling Center  [phone]  Hours: Mon, Wed, Fri 1-3pm; Mon-Thurs 6:30-8:30pm      Veterans Services    [facility]  [phone]  Hours: Mon-Fri 8am-4:30 pm; [facility]st [facility]    [facility] Community Resource & Referral  [phone]  [address]; Hours: Mon-Fri 7a-5p    [place] Assistance [place] for Vets (MACV)  [phone]    [facility] Supportive Services for Veterans & Families (SSVF)  [phone] [address]

## 2025-03-22 ENCOUNTER — HEALTH MAINTENANCE LETTER (OUTPATIENT)
Age: 50
End: 2025-03-22

## (undated) DEVICE — INFL DVC BASIXCOMPAK PLYCRB 30 ATM 13IN 20ML IN4530

## (undated) DEVICE — CATH BALLOON NC EMERGE 2.75X20MM H7493926720270

## (undated) DEVICE — INTRO SHEATH 6FRX10CM PINNACLE RSS602

## (undated) DEVICE — KIT HAND CONTROL ANGIOTOUCH ACIST 65CM AT-P65

## (undated) DEVICE — INTRO GLIDESHEATH SLENDER 6FR 10X45CM 60-1060

## (undated) DEVICE — CATH ANGIO INFINITI 3DRC 4FRX100CM 538476

## (undated) DEVICE — CATH BALLOON EMERGE 2.5X12MM H7493918912250

## (undated) DEVICE — VALVE HEMOSTASIS .096" COPILOT MECH 1003331

## (undated) DEVICE — CATH ANGIO JUDKINS JL4 6FRX100CM INFINITI 534620T

## (undated) DEVICE — GUIDEWIRE VASC 0.014INX180CM RUNTHROUGH 25-1011

## (undated) DEVICE — CATH ANGIO INFINITI 3DRC 6FRX100CM 534676T

## (undated) DEVICE — Device

## (undated) DEVICE — WIRE GUIDE 0.035"X260CM SAFE-T-J EXCHANGE G00517

## (undated) DEVICE — MANIFOLD KIT ANGIO AUTOMATED 014613

## (undated) DEVICE — CATH BALLOON NC EMERGE 3.50X15MM H7493926715350

## (undated) DEVICE — CATH DIAGNOSTIC RADIAL 5FR TIG 4.0

## (undated) DEVICE — CATH BALLOON NC EMERGE 3.00X15MM H7493926715300

## (undated) DEVICE — CATH GUIDING  6F MACH 1 GUIDING CLS3

## (undated) DEVICE — CATH ANGIO INFINITI JR4 4FRX100CM 538421

## (undated) DEVICE — CATH GUIDING  6F MACH 1 GUIDING CLS3.5

## (undated) DEVICE — CATH ANGIO JUDKINS JL3.5 6FRX100CM INFINITI 534618T

## (undated) DEVICE — SLEEVE TR BAND RADIAL COMPRESSION DEVICE 24CM TRB24-REG

## (undated) DEVICE — DEFIB PRO-PADZ LVP LQD GEL ADULT 8900-2105-01

## (undated) DEVICE — INTRODUCER CATH VASC 5FRX10CM  MPIS-501-NT-U-SST

## (undated) RX ORDER — HEPARIN SODIUM 200 [USP'U]/100ML
INJECTION, SOLUTION INTRAVENOUS
Status: DISPENSED
Start: 2024-01-23

## (undated) RX ORDER — NITROGLYCERIN 5 MG/ML
VIAL (ML) INTRAVENOUS
Status: DISPENSED
Start: 2024-03-06

## (undated) RX ORDER — LIDOCAINE HYDROCHLORIDE 10 MG/ML
INJECTION, SOLUTION EPIDURAL; INFILTRATION; INTRACAUDAL; PERINEURAL
Status: DISPENSED
Start: 2024-03-06

## (undated) RX ORDER — HEPARIN SODIUM 1000 [USP'U]/ML
INJECTION, SOLUTION INTRAVENOUS; SUBCUTANEOUS
Status: DISPENSED
Start: 2024-01-23

## (undated) RX ORDER — HEPARIN SODIUM 1000 [USP'U]/ML
INJECTION, SOLUTION INTRAVENOUS; SUBCUTANEOUS
Status: DISPENSED
Start: 2024-03-06

## (undated) RX ORDER — VERAPAMIL HYDROCHLORIDE 2.5 MG/ML
INJECTION, SOLUTION INTRAVENOUS
Status: DISPENSED
Start: 2024-03-06

## (undated) RX ORDER — NITROGLYCERIN 5 MG/ML
VIAL (ML) INTRAVENOUS
Status: DISPENSED
Start: 2024-01-23

## (undated) RX ORDER — FENTANYL CITRATE 50 UG/ML
INJECTION, SOLUTION INTRAMUSCULAR; INTRAVENOUS
Status: DISPENSED
Start: 2024-03-06